# Patient Record
Sex: MALE | Race: BLACK OR AFRICAN AMERICAN | Employment: UNEMPLOYED | URBAN - METROPOLITAN AREA
[De-identification: names, ages, dates, MRNs, and addresses within clinical notes are randomized per-mention and may not be internally consistent; named-entity substitution may affect disease eponyms.]

---

## 2017-02-08 ENCOUNTER — HOSPITAL ENCOUNTER (EMERGENCY)
Age: 47
Discharge: HOME | End: 2017-02-08
Attending: EMERGENCY MEDICINE
Payer: MEDICARE

## 2017-02-08 ENCOUNTER — APPOINTMENT (OUTPATIENT)
Dept: GENERAL RADIOLOGY | Age: 47
End: 2017-02-08
Attending: EMERGENCY MEDICINE
Payer: MEDICARE

## 2017-02-08 VITALS
OXYGEN SATURATION: 100 % | WEIGHT: 210 LBS | RESPIRATION RATE: 20 BRPM | DIASTOLIC BLOOD PRESSURE: 98 MMHG | HEIGHT: 66 IN | SYSTOLIC BLOOD PRESSURE: 135 MMHG | HEART RATE: 85 BPM | TEMPERATURE: 97.8 F | BODY MASS INDEX: 33.75 KG/M2

## 2017-02-08 DIAGNOSIS — R06.02 SOB (SHORTNESS OF BREATH): Primary | ICD-10-CM

## 2017-02-08 LAB
ALBUMIN SERPL BCP-MCNC: 3.5 G/DL (ref 3.4–5)
ALBUMIN/GLOB SERPL: 0.9 {RATIO} (ref 0.8–1.7)
ALP SERPL-CCNC: 75 U/L (ref 45–117)
ALT SERPL-CCNC: 48 U/L (ref 16–61)
ANION GAP BLD CALC-SCNC: 5 MMOL/L (ref 3–18)
APTT PPP: 26.4 SEC (ref 23–36.4)
AST SERPL W P-5'-P-CCNC: 35 U/L (ref 15–37)
ATRIAL RATE: 89 BPM
BASOPHILS # BLD AUTO: 0.1 K/UL (ref 0–0.06)
BASOPHILS # BLD: 1 % (ref 0–2)
BILIRUB SERPL-MCNC: 0.7 MG/DL (ref 0.2–1)
BNP SERPL-MCNC: 2768 PG/ML (ref 0–450)
BUN SERPL-MCNC: 19 MG/DL (ref 7–18)
BUN/CREAT SERPL: 11 (ref 12–20)
CALCIUM SERPL-MCNC: 8.5 MG/DL (ref 8.5–10.1)
CALCULATED P AXIS, ECG09: 64 DEGREES
CALCULATED R AXIS, ECG10: 37 DEGREES
CALCULATED T AXIS, ECG11: -111 DEGREES
CHLORIDE SERPL-SCNC: 105 MMOL/L (ref 100–108)
CO2 SERPL-SCNC: 30 MMOL/L (ref 21–32)
CREAT SERPL-MCNC: 1.71 MG/DL (ref 0.6–1.3)
DIAGNOSIS, 93000: NORMAL
DIFFERENTIAL METHOD BLD: ABNORMAL
EOSINOPHIL # BLD: 0.3 K/UL (ref 0–0.4)
EOSINOPHIL NFR BLD: 5 % (ref 0–5)
ERYTHROCYTE [DISTWIDTH] IN BLOOD BY AUTOMATED COUNT: 13.6 % (ref 11.6–14.5)
GLOBULIN SER CALC-MCNC: 3.8 G/DL (ref 2–4)
GLUCOSE SERPL-MCNC: 99 MG/DL (ref 74–99)
HCT VFR BLD AUTO: 39.7 % (ref 36–48)
HGB BLD-MCNC: 13.2 G/DL (ref 13–16)
INR PPP: 1 (ref 0.8–1.2)
LYMPHOCYTES # BLD AUTO: 47 % (ref 21–52)
LYMPHOCYTES # BLD: 2.5 K/UL (ref 0.9–3.6)
MCH RBC QN AUTO: 28.6 PG (ref 24–34)
MCHC RBC AUTO-ENTMCNC: 33.2 G/DL (ref 31–37)
MCV RBC AUTO: 86.1 FL (ref 74–97)
MONOCYTES # BLD: 0.6 K/UL (ref 0.05–1.2)
MONOCYTES NFR BLD AUTO: 11 % (ref 3–10)
NEUTS SEG # BLD: 1.9 K/UL (ref 1.8–8)
NEUTS SEG NFR BLD AUTO: 36 % (ref 40–73)
P-R INTERVAL, ECG05: 158 MS
PLATELET # BLD AUTO: 260 K/UL (ref 135–420)
PMV BLD AUTO: 9.6 FL (ref 9.2–11.8)
POTASSIUM SERPL-SCNC: 3.9 MMOL/L (ref 3.5–5.5)
PROT SERPL-MCNC: 7.3 G/DL (ref 6.4–8.2)
PROTHROMBIN TIME: 12.7 SEC (ref 11.5–15.2)
Q-T INTERVAL, ECG07: 452 MS
QRS DURATION, ECG06: 182 MS
QTC CALCULATION (BEZET), ECG08: 549 MS
RBC # BLD AUTO: 4.61 M/UL (ref 4.7–5.5)
SODIUM SERPL-SCNC: 140 MMOL/L (ref 136–145)
TROPONIN I SERPL-MCNC: 0.04 NG/ML (ref 0–0.06)
VENTRICULAR RATE, ECG03: 89 BPM
WBC # BLD AUTO: 5.4 K/UL (ref 4.6–13.2)

## 2017-02-08 PROCEDURE — 74011250636 HC RX REV CODE- 250/636: Performed by: EMERGENCY MEDICINE

## 2017-02-08 PROCEDURE — 80053 COMPREHEN METABOLIC PANEL: CPT | Performed by: EMERGENCY MEDICINE

## 2017-02-08 PROCEDURE — 84484 ASSAY OF TROPONIN QUANT: CPT | Performed by: EMERGENCY MEDICINE

## 2017-02-08 PROCEDURE — 71020 XR CHEST PA LAT: CPT

## 2017-02-08 PROCEDURE — 83880 ASSAY OF NATRIURETIC PEPTIDE: CPT | Performed by: EMERGENCY MEDICINE

## 2017-02-08 PROCEDURE — 85025 COMPLETE CBC W/AUTO DIFF WBC: CPT | Performed by: EMERGENCY MEDICINE

## 2017-02-08 PROCEDURE — 99284 EMERGENCY DEPT VISIT MOD MDM: CPT

## 2017-02-08 PROCEDURE — 96374 THER/PROPH/DIAG INJ IV PUSH: CPT

## 2017-02-08 PROCEDURE — 93005 ELECTROCARDIOGRAM TRACING: CPT

## 2017-02-08 PROCEDURE — 85730 THROMBOPLASTIN TIME PARTIAL: CPT | Performed by: EMERGENCY MEDICINE

## 2017-02-08 PROCEDURE — 85610 PROTHROMBIN TIME: CPT | Performed by: EMERGENCY MEDICINE

## 2017-02-08 RX ORDER — FUROSEMIDE 10 MG/ML
40 INJECTION INTRAMUSCULAR; INTRAVENOUS
Status: COMPLETED | OUTPATIENT
Start: 2017-02-08 | End: 2017-02-08

## 2017-02-08 RX ADMIN — FUROSEMIDE 40 MG: 10 INJECTION, SOLUTION INTRAVENOUS at 15:04

## 2017-02-08 NOTE — ED NOTES
IV removed, VS obtained. Patient received and reviewed discharge instructions and follow up plan. Nad at discharge by ambulation with family at side. Patient ID band removed prior to discharge.

## 2017-02-08 NOTE — ED NOTES
Patient on phone when this nurse attempted to assess. Patient did not end phone conversation but is not to be in no distress.

## 2017-02-08 NOTE — DISCHARGE INSTRUCTIONS

## 2017-02-08 NOTE — ED PROVIDER NOTES
HPI Comments: 12:48 PM Perla Do is a 51 y.o. male with h/o HTN, CHF and PTSD who presents to ED complaining of constant SOB onset a couple days ago. Pt admits the SOB is worsened upon exertion. He reports some episodes of diaphoresis. Pt denies cough, CP, back, fever or chills. He denies smoking cigarettes. No other concerns nor complaints at this time. PCP: Barbara Bergman MD  Cardiologist: [facility]     The history is provided by the patient. Past Medical History:   Diagnosis Date    CHF (congestive heart failure) ([facility] [address] 12/2013     [facility]    Hypertension     [facility]    Sleep apnea        History reviewed. No pertinent past surgical history. History reviewed. No pertinent family history. Social History     Social History    Marital status:      Spouse name: N/A    Number of children: N/A    Years of education: N/A     Occupational History    retired      Social History Main Topics    Smoking status: Never Smoker    Smokeless tobacco: Never Used    Alcohol use Yes      Comment: Social drinker    Drug use: No    Sexual activity: Not on file     Other Topics Concern    Caffeine Concern Yes     4- 5 cups a day    Exercise No     not sedentary    [address] [facility] Yes     Social History Narrative         ALLERGIES: Review of patient's allergies indicates no known allergies. Review of Systems   Constitutional: Positive for diaphoresis. Negative for chills, fatigue, fever and unexpected weight change. HENT: Negative for congestion and rhinorrhea. Respiratory: Positive for shortness of breath (constant). Negative for chest tightness. Cardiovascular: Negative for chest pain, palpitations and leg swelling. Gastrointestinal: Negative for abdominal pain, nausea and vomiting. Genitourinary: Negative for dysuria. Musculoskeletal: Negative for back pain. Skin: Negative for rash.    Neurological: Negative for dizziness and weakness. Psychiatric/Behavioral: The patient is not nervous/anxious. All other systems reviewed and are negative. Vitals:    02/08/17 1327 02/08/17 1336 02/08/17 1430 02/08/17 1445   BP: (!) 138/99  (!) 137/99 (!) 136/93   Pulse:  88 89 88   Resp:  21 29 (!) 31   Temp:       SpO2: 97% 98% 99% 99%   Weight:       Height:                Physical Exam   Constitutional: He is oriented to person, place, and time. He appears well-developed and well-nourished. No distress. HENT:   Head: Normocephalic and atraumatic. Right Ear: External ear normal.   Left Ear: External ear normal.   Nose: Nose normal.   Mouth/Throat: Oropharynx is clear and moist.   Eyes: Conjunctivae and EOM are normal. Pupils are equal, round, and reactive to light. No scleral icterus. Neck: Normal range of motion. Neck supple. No JVD present. No tracheal deviation present. No thyromegaly present. Cardiovascular: Normal rate, regular rhythm, normal heart sounds and intact distal pulses. Exam reveals no gallop and no friction rub. No murmur heard. Pulmonary/Chest: Effort normal and breath sounds normal. He exhibits no tenderness. Abdominal: Soft. Bowel sounds are normal. He exhibits no distension. There is no tenderness. There is no rebound and no guarding. Musculoskeletal: Normal range of motion. He exhibits no edema or tenderness. Lymphadenopathy:     He has no cervical adenopathy. Neurological: He is alert and oriented to person, place, and time. No cranial nerve deficit. Coordination normal.   Skin: Skin is warm and dry. Psychiatric: He has a normal mood and affect. His behavior is normal. Judgment and thought content normal.   Nursing note and vitals reviewed.        MDM  Number of Diagnoses or Management Options     Amount and/or Complexity of Data Reviewed  Clinical lab tests: ordered and reviewed  Tests in the radiology section of CPT®: ordered and reviewed    Risk of Complications, Morbidity, and/or Mortality  Presenting problems: moderate  Diagnostic procedures: moderate  Management options: moderate      ED Course       Procedures  Vitals:  Patient Vitals for the past 12 hrs:   Temp Pulse Resp BP SpO2   02/08/17 1445 - 88 (!) 31 (!) 136/93 99 %   02/08/17 1430 - 89 29 (!) 137/99 99 %   02/08/17 1336 - 88 21 - 98 %   02/08/17 1327 - - - (!) 138/99 97 %   02/08/17 1226 97.8 °F (36.6 °C) 97 20 (!) 144/102 98 %         Medications ordered:   Medications   furosemide (LASIX) injection 40 mg (40 mg IntraVENous Given 2/8/17 1504)         Lab findings:  Recent Results (from the past 12 hour(s))   EKG, 12 LEAD, INITIAL    Collection Time: 02/08/17 12:36 PM   Result Value Ref Range    Ventricular Rate 89 BPM    Atrial Rate 89 BPM    P-R Interval 158 ms    QRS Duration 182 ms    Q-T Interval 452 ms    QTC Calculation (Bezet) 549 ms    Calculated P Axis 64 degrees    Calculated R Axis 37 degrees    Calculated T Axis -111 degrees    Diagnosis       Normal sinus rhythm  Possible Left atrial enlargement  Left bundle branch block  Abnormal ECG  When compared with ECG of 17-NOV-2016 17:27,  No significant change was found     CBC WITH AUTOMATED DIFF    Collection Time: 02/08/17  1:20 PM   Result Value Ref Range    WBC 5.4 4.6 - 13.2 K/uL    RBC 4.61 (L) 4.70 - 5.50 M/uL    HGB 13.2 13.0 - 16.0 g/dL    HCT 39.7 36.0 - 48.0 %    MCV 86.1 74.0 - 97.0 FL    MCH 28.6 24.0 - 34.0 PG    MCHC 33.2 31.0 - 37.0 g/dL    RDW 13.6 11.6 - 14.5 %    PLATELET 349 738 - 150 K/uL    MPV 9.6 9.2 - 11.8 FL    NEUTROPHILS 36 (L) 40 - 73 %    LYMPHOCYTES 47 21 - 52 %    MONOCYTES 11 (H) 3 - 10 %    EOSINOPHILS 5 0 - 5 %    BASOPHILS 1 0 - 2 %    ABS. NEUTROPHILS 1.9 1.8 - 8.0 K/UL    ABS. LYMPHOCYTES 2.5 0.9 - 3.6 K/UL    ABS. MONOCYTES 0.6 0.05 - 1.2 K/UL    ABS. EOSINOPHILS 0.3 0.0 - 0.4 K/UL    ABS.  BASOPHILS 0.1 (H) 0.0 - 0.06 K/UL    DF AUTOMATED     PROTHROMBIN TIME + INR    Collection Time: 02/08/17  1:20 PM   Result Value Ref Range Prothrombin time 12.7 11.5 - 15.2 sec    INR 1.0 0.8 - 1.2     PTT    Collection Time: 02/08/17  1:20 PM   Result Value Ref Range    aPTT 26.4 23.0 - 17.5 SEC   METABOLIC PANEL, COMPREHENSIVE    Collection Time: 02/08/17  1:20 PM   Result Value Ref Range    Sodium 140 136 - 145 mmol/L    Potassium 3.9 3.5 - 5.5 mmol/L    Chloride 105 100 - 108 mmol/L    CO2 30 21 - 32 mmol/L    Anion gap 5 3.0 - 18 mmol/L    Glucose 99 74 - 99 mg/dL    BUN 19 (H) 7.0 - 18 MG/DL    Creatinine 1.71 (H) 0.6 - 1.3 MG/DL    BUN/Creatinine ratio 11 (L) 12 - 20      GFR est AA 52 (L) >60 ml/min/1.73m2    GFR est non-AA 43 (L) >60 ml/min/1.73m2    Calcium 8.5 8.5 - 10.1 MG/DL    Bilirubin, total 0.7 0.2 - 1.0 MG/DL    ALT (SGPT) 48 16 - 61 U/L    AST (SGOT) 35 15 - 37 U/L    Alk. phosphatase 75 45 - 117 U/L    Protein, total 7.3 6.4 - 8.2 g/dL    Albumin 3.5 3.4 - 5.0 g/dL    Globulin 3.8 2.0 - 4.0 g/dL    A-G Ratio 0.9 0.8 - 1.7     PRO-BNP    Collection Time: 02/08/17  1:20 PM   Result Value Ref Range    NT pro-BNP 2768 (H) 0 - 450 PG/ML   TROPONIN I    Collection Time: 02/08/17  1:20 PM   Result Value Ref Range    Troponin-I, Qt. 0.04 0.00 - 0.06 NG/ML           X-Ray, CT or other radiology findings or impressions:  XR CHEST PA LAT   Final Result   1. Hypoinflation. 2. Mild to moderate prominence of the cardiac silhouette. 3. No infiltrate or consolidation. Read by radiology     Progress notes, Consult notes or additional Procedure notes:   3:50 PM Reassessed pt. Patient is feeling great. Pt is asymptomatic. Patient has some mild pulmonary edema which he states he has every couple of months. He states this is resolved by taking Lasix. Patient told to take his Lasix daily. Reviewed all results with pt and pt agrees with plan for discharge and appropriate  Follow up. All questions answered at this time. Pt voices understanding. Disposition:  Diagnosis:   1.  SOB (shortness of breath)        Disposition: discharged     Follow-up Information     Follow up With Details Comments Contact Info    Barbara Bergman MD Schedule an appointment as soon as possible for a visit in 2 days If symptoms worsen return to the ER [address]  [address] [place] [address]  [phone]      [facility] an appointment as soon as possible for a visit in 1 week If symptoms worsen return to the  [place] [address]  [phone]           Patient's Medications   Start Taking    No medications on file   Continue Taking    AMLODIPINE (NORVASC) 5 MG TABLET    Take 5 mg by mouth daily. ASPIRIN (ASPIRIN) 325 MG TABLET    Take 325 mg by mouth daily. CARVEDILOL (COREG) 25 MG TABLET    Take 25 mg by mouth two (2) times daily (with meals). FUROSEMIDE (LASIX) 20 MG TABLET    TAKE 1 TABLET BY MOUTH DAILY    HYDRALAZINE (APRESOLINE) 50 MG TABLET    Take 50 mg by mouth three (3) times daily. ISOSORBIDE MONONITRATE ER (IMDUR) 60 MG CR TABLET    Take  by mouth every morning. LISINOPRIL (PRINIVIL, ZESTRIL) 10 MG TABLET    Take 1 Tab by mouth daily. SERTRALINE (ZOLOFT) 100 MG TABLET       These Medications have changed    No medications on file   Stop Taking    No medications on file       Scribe Attestation  Brit Jaime scribing for and in the presence of Nika Hendricks MD (02/08/17/ 12:49 PM)    Physician Attestation  I personally performed the services described in this documentation, reviewed, and edited the documentation which was dictated to the scribe in my presence, and it accurately records my own words and actions.      Nika Hendricks MD (02/08/17/ 12:49 PM)    Signed by: Barry Mann, 02/08/17, 12:49 PM

## 2017-03-21 ENCOUNTER — HOSPITAL ENCOUNTER (EMERGENCY)
Age: 47
Discharge: HOME | End: 2017-03-21
Attending: EMERGENCY MEDICINE
Payer: MEDICARE

## 2017-03-21 ENCOUNTER — APPOINTMENT (OUTPATIENT)
Dept: GENERAL RADIOLOGY | Age: 47
End: 2017-03-21
Attending: EMERGENCY MEDICINE
Payer: MEDICARE

## 2017-03-21 VITALS
BODY MASS INDEX: 34.55 KG/M2 | SYSTOLIC BLOOD PRESSURE: 116 MMHG | OXYGEN SATURATION: 96 % | HEIGHT: 66 IN | HEART RATE: 84 BPM | DIASTOLIC BLOOD PRESSURE: 79 MMHG | WEIGHT: 215 LBS | RESPIRATION RATE: 27 BRPM

## 2017-03-21 DIAGNOSIS — I50.23 ACUTE ON CHRONIC SYSTOLIC CONGESTIVE HEART FAILURE (HCC): Primary | ICD-10-CM

## 2017-03-21 LAB
ANION GAP BLD CALC-SCNC: 9 MMOL/L (ref 3–18)
BASOPHILS # BLD AUTO: 0.1 K/UL (ref 0–0.06)
BASOPHILS # BLD: 1 % (ref 0–2)
BNP SERPL-MCNC: 5564 PG/ML (ref 0–450)
BUN SERPL-MCNC: 18 MG/DL (ref 7–18)
BUN/CREAT SERPL: 12 (ref 12–20)
CALCIUM SERPL-MCNC: 9.2 MG/DL (ref 8.5–10.1)
CHLORIDE SERPL-SCNC: 105 MMOL/L (ref 100–108)
CO2 SERPL-SCNC: 27 MMOL/L (ref 21–32)
CREAT SERPL-MCNC: 1.56 MG/DL (ref 0.6–1.3)
DIFFERENTIAL METHOD BLD: ABNORMAL
EOSINOPHIL # BLD: 0.2 K/UL (ref 0–0.4)
EOSINOPHIL NFR BLD: 4 % (ref 0–5)
ERYTHROCYTE [DISTWIDTH] IN BLOOD BY AUTOMATED COUNT: 14.4 % (ref 11.6–14.5)
GLUCOSE SERPL-MCNC: 116 MG/DL (ref 74–99)
HCT VFR BLD AUTO: 41.8 % (ref 36–48)
HGB BLD-MCNC: 13.9 G/DL (ref 13–16)
LYMPHOCYTES # BLD AUTO: 37 % (ref 21–52)
LYMPHOCYTES # BLD: 2.2 K/UL (ref 0.9–3.6)
MCH RBC QN AUTO: 28.1 PG (ref 24–34)
MCHC RBC AUTO-ENTMCNC: 33.3 G/DL (ref 31–37)
MCV RBC AUTO: 84.6 FL (ref 74–97)
MONOCYTES # BLD: 0.5 K/UL (ref 0.05–1.2)
MONOCYTES NFR BLD AUTO: 8 % (ref 3–10)
NEUTS SEG # BLD: 2.9 K/UL (ref 1.8–8)
NEUTS SEG NFR BLD AUTO: 50 % (ref 40–73)
PLATELET # BLD AUTO: 255 K/UL (ref 135–420)
PMV BLD AUTO: 10 FL (ref 9.2–11.8)
POTASSIUM SERPL-SCNC: 3.9 MMOL/L (ref 3.5–5.5)
RBC # BLD AUTO: 4.94 M/UL (ref 4.7–5.5)
SODIUM SERPL-SCNC: 141 MMOL/L (ref 136–145)
TROPONIN I SERPL-MCNC: 0.06 NG/ML (ref 0–0.06)
WBC # BLD AUTO: 5.8 K/UL (ref 4.6–13.2)

## 2017-03-21 PROCEDURE — 99285 EMERGENCY DEPT VISIT HI MDM: CPT

## 2017-03-21 PROCEDURE — 94762 N-INVAS EAR/PLS OXIMTRY CONT: CPT

## 2017-03-21 PROCEDURE — 84484 ASSAY OF TROPONIN QUANT: CPT | Performed by: EMERGENCY MEDICINE

## 2017-03-21 PROCEDURE — 71020 XR CHEST AP LAT: CPT

## 2017-03-21 PROCEDURE — 83880 ASSAY OF NATRIURETIC PEPTIDE: CPT | Performed by: EMERGENCY MEDICINE

## 2017-03-21 PROCEDURE — 85025 COMPLETE CBC W/AUTO DIFF WBC: CPT | Performed by: EMERGENCY MEDICINE

## 2017-03-21 PROCEDURE — 80048 BASIC METABOLIC PNL TOTAL CA: CPT | Performed by: EMERGENCY MEDICINE

## 2017-03-21 PROCEDURE — 93005 ELECTROCARDIOGRAM TRACING: CPT

## 2017-03-21 PROCEDURE — 74011250637 HC RX REV CODE- 250/637: Performed by: EMERGENCY MEDICINE

## 2017-03-21 PROCEDURE — 74011250636 HC RX REV CODE- 250/636: Performed by: EMERGENCY MEDICINE

## 2017-03-21 PROCEDURE — 96374 THER/PROPH/DIAG INJ IV PUSH: CPT

## 2017-03-21 RX ORDER — FUROSEMIDE 10 MG/ML
20 INJECTION INTRAMUSCULAR; INTRAVENOUS
Status: COMPLETED | OUTPATIENT
Start: 2017-03-21 | End: 2017-03-21

## 2017-03-21 RX ORDER — AMLODIPINE BESYLATE 5 MG/1
5 TABLET ORAL
Status: COMPLETED | OUTPATIENT
Start: 2017-03-21 | End: 2017-03-21

## 2017-03-21 RX ORDER — CARVEDILOL 6.25 MG/1
25 TABLET ORAL
Status: COMPLETED | OUTPATIENT
Start: 2017-03-21 | End: 2017-03-21

## 2017-03-21 RX ORDER — LISINOPRIL 5 MG/1
10 TABLET ORAL
Status: COMPLETED | OUTPATIENT
Start: 2017-03-21 | End: 2017-03-21

## 2017-03-21 RX ORDER — HYDRALAZINE HYDROCHLORIDE 25 MG/1
50 TABLET, FILM COATED ORAL
Status: COMPLETED | OUTPATIENT
Start: 2017-03-21 | End: 2017-03-21

## 2017-03-21 RX ADMIN — FUROSEMIDE 20 MG: 10 INJECTION, SOLUTION INTRAVENOUS at 09:04

## 2017-03-21 RX ADMIN — AMLODIPINE BESYLATE 5 MG: 5 TABLET ORAL at 08:22

## 2017-03-21 RX ADMIN — LISINOPRIL 10 MG: 5 TABLET ORAL at 08:22

## 2017-03-21 RX ADMIN — NITROGLYCERIN 1 INCH: 20 OINTMENT TOPICAL at 08:22

## 2017-03-21 RX ADMIN — HYDRALAZINE HYDROCHLORIDE 50 MG: 25 TABLET, FILM COATED ORAL at 08:22

## 2017-03-21 RX ADMIN — CARVEDILOL 25 MG: 6.25 TABLET, FILM COATED ORAL at 08:22

## 2017-03-21 NOTE — ED NOTES
Pt reports shortness of breath has improved. Pt remains with intermittent dry cough. Pt remains in NAD. Pt denies any needs from RN at this time.

## 2017-03-21 NOTE — ED PROVIDER NOTES
HPI Comments: 8:02 AM Jinny Cockayne is a 51 y.o. male with a history of HTN, PTSD, and CHF who presents to the ED complaining of SOB onset last night. Patient states when he lays down he feels like there is a \"block on his chest.\" He notes that he has felt like this before due to his CHF. Patient states \"I think I have the flu. \" He notes that he takes daily medication, but he mentions that he has not taken them today. Patient also c/o chills and productive cough. He denies any other symptoms. There are no other concerns at this time. The history is provided by the patient. Past Medical History:   Diagnosis Date    CHF (congestive heart failure) ([facility] [address] 12/2013    [facility]    Hypertension     [facility]     [facility]    Sleep apnea        History reviewed. No pertinent surgical history. History reviewed. No pertinent family history. Social History     Social History    Marital status:      Spouse name: N/A    Number of children: N/A    Years of education: N/A     Occupational History    retired      Social History Main Topics    Smoking status: Never Smoker    Smokeless tobacco: Never Used    Alcohol use Yes      Comment: Social drinker    Drug use: No    Sexual activity: Not on file     Other Topics Concern    Caffeine Concern Yes     4- 5 cups a day    Exercise No     not sedentary    [place] Yes     Social History Narrative         ALLERGIES: Review of patient's allergies indicates no known allergies. Review of Systems   Constitutional: Positive for chills. Negative for fatigue, fever and unexpected weight change. HENT: Negative for congestion and rhinorrhea. Respiratory: Positive for cough and shortness of breath. Negative for chest tightness. Cardiovascular: Negative for chest pain, palpitations and leg swelling. Gastrointestinal: Negative for abdominal pain, nausea and vomiting. Genitourinary: Negative for dysuria. Musculoskeletal: Negative for back pain. Skin: Negative for rash. Neurological: Negative for dizziness and weakness. Psychiatric/Behavioral: The patient is not nervous/anxious. Vitals:    03/21/17 0833 03/21/17 0844 03/21/17 0859 03/21/17 0900   BP:    (!) 140/92   Pulse: 98 99 97 93   Resp: (!) 34 26     SpO2: 96% 96% 97% 98%   Weight:       Height:                Physical Exam   Constitutional: He is oriented to person, place, and time. He appears well-developed and well-nourished. No distress. HENT:   Head: Normocephalic and atraumatic. Right Ear: External ear normal.   Left Ear: External ear normal.   Nose: Nose normal.   Mouth/Throat: Oropharynx is clear and moist.   Eyes: Conjunctivae and EOM are normal. Pupils are equal, round, and reactive to light. No scleral icterus. Neck: Normal range of motion. Neck supple. No JVD present. No tracheal deviation present. No thyromegaly present. Cardiovascular: Normal rate, regular rhythm, normal heart sounds and intact distal pulses. Exam reveals no gallop and no friction rub. No murmur heard. Pulmonary/Chest: Effort normal and breath sounds normal. He exhibits no tenderness. Abdominal: Soft. Bowel sounds are normal. He exhibits no distension. There is no tenderness. There is no rebound and no guarding. Musculoskeletal: Normal range of motion. He exhibits no edema or tenderness. Lymphadenopathy:     He has no cervical adenopathy. Neurological: He is alert and oriented to person, place, and time. No cranial nerve deficit. Coordination normal.   No sensory loss, Gait normal, Motor 5/5   Skin: Skin is warm and dry. Psychiatric: He has a normal mood and affect. His behavior is normal. Judgment and thought content normal.   Nursing note and vitals reviewed.        MDM  Number of Diagnoses or Management Options  Acute on chronic systolic congestive heart failure [facility]):   Diagnosis management comments: Pt has been diuresing after meds given, feels \"much better\". Wants to go home, agrees with dispo and F/U plan. Andrez Vences MD  10:26 AM         Amount and/or Complexity of Data Reviewed  Clinical lab tests: ordered and reviewed  Tests in the radiology section of CPT®: ordered and reviewed      ED Course       Procedures    Vitals:  Patient Vitals for the past 12 hrs:   Pulse Resp BP SpO2   03/21/17 0900 93 - (!) 140/92 98 %   03/21/17 0859 97 - - 97 %   03/21/17 0844 99 26 - 96 %   03/21/17 0833 98 (!) 34 - 96 %   03/21/17 0830 99 29 (!) 156/113 98 %   03/21/17 0816 97 18 - 99 %   03/21/17 0815 - - (!) 157/112 -   03/21/17 0800 - - (!) 156/113 95 %   03/21/17 0756 - - (!) 173/112 97 %   03/21/17 0754 - - - 95 %   03/21/17 0748 - - - 98 %   03/21/17 0744 (!) 101 20 (!) 159/127 99 %   03/21/17 0742 - - (!) 159/127 99 %   99% on RA, indicating adequate oxygenation.       Medications ordered:   Medications   nitroglycerin (NITROBID) 2 % ointment 1 Inch (1 Inch Topical Given 3/21/17 0822)   amLODIPine (NORVASC) tablet 5 mg (5 mg Oral Given 3/21/17 0822)   carvedilol (COREG) tablet 25 mg (25 mg Oral Given 3/21/17 0822)   hydrALAZINE (APRESOLINE) tablet 50 mg (50 mg Oral Given 3/21/17 0822)   lisinopril (PRINIVIL, ZESTRIL) tablet 10 mg (10 mg Oral Given 3/21/17 5518)   furosemide (LASIX) injection 20 mg (20 mg IntraVENous Given 3/21/17 0904)         Lab findings:  Recent Results (from the past 12 hour(s))   EKG, 12 LEAD, INITIAL    Collection Time: 03/21/17  7:45 AM   Result Value Ref Range    Ventricular Rate 101 BPM    Atrial Rate 101 BPM    P-R Interval 142 ms    QRS Duration 174 ms    Q-T Interval 420 ms    QTC Calculation (Bezet) 544 ms    Calculated P Axis 64 degrees    Calculated R Axis 38 degrees    Calculated T Axis -23 degrees    Diagnosis       Sinus tachycardia  Possible Left atrial enlargement  Left bundle branch block  Abnormal ECG  When compared with ECG of 08-FEB-2017 12:36,  No significant change was found     METABOLIC PANEL, BASIC Collection Time: 03/21/17  8:25 AM   Result Value Ref Range    Sodium 141 136 - 145 mmol/L    Potassium 3.9 3.5 - 5.5 mmol/L    Chloride 105 100 - 108 mmol/L    CO2 27 21 - 32 mmol/L    Anion gap 9 3.0 - 18 mmol/L    Glucose 116 (H) 74 - 99 mg/dL    BUN 18 7.0 - 18 MG/DL    Creatinine 1.56 (H) 0.6 - 1.3 MG/DL    BUN/Creatinine ratio 12 12 - 20      GFR est AA 58 (L) >60 ml/min/1.73m2    GFR est non-AA 48 (L) >60 ml/min/1.73m2    Calcium 9.2 8.5 - 10.1 MG/DL   PRO-BNP    Collection Time: 03/21/17  8:25 AM   Result Value Ref Range    NT pro-BNP 5564 (H) 0 - 450 PG/ML   CBC WITH AUTOMATED DIFF    Collection Time: 03/21/17  8:25 AM   Result Value Ref Range    WBC 5.8 4.6 - 13.2 K/uL    RBC 4.94 4.70 - 5.50 M/uL    HGB 13.9 13.0 - 16.0 g/dL    HCT 41.8 36.0 - 48.0 %    MCV 84.6 74.0 - 97.0 FL    MCH 28.1 24.0 - 34.0 PG    MCHC 33.3 31.0 - 37.0 g/dL    RDW 14.4 11.6 - 14.5 %    PLATELET 553 674 - 360 K/uL    MPV 10.0 9.2 - 11.8 FL    NEUTROPHILS 50 40 - 73 %    LYMPHOCYTES 37 21 - 52 %    MONOCYTES 8 3 - 10 %    EOSINOPHILS 4 0 - 5 %    BASOPHILS 1 0 - 2 %    ABS. NEUTROPHILS 2.9 1.8 - 8.0 K/UL    ABS. LYMPHOCYTES 2.2 0.9 - 3.6 K/UL    ABS. MONOCYTES 0.5 0.05 - 1.2 K/UL    ABS. EOSINOPHILS 0.2 0.0 - 0.4 K/UL    ABS. BASOPHILS 0.1 (H) 0.0 - 0.06 K/UL    DF AUTOMATED     TROPONIN I    Collection Time: 03/21/17  8:25 AM   Result Value Ref Range    Troponin-I, Qt. 0.06 0.00 - 0.06 NG/ML       EKG interpretation by ED Physician:    8:10 AM Sinus tachycardia at a rate of 101 with left bundle branch block. No acute changes. Interpreted by Dr. Gibson Arndt. X-Ray, CT or other radiology findings or impressions:  XR CHEST AP LAT        9:25 AM Cardiomegaly. No acute changes. Interpreted by Dr. Gibson Arndt. Progress notes, Consult notes or additional Procedure notes:         Disposition:  Diagnosis: No diagnosis found.     Disposition:     Follow-up Information     None           Patient's Medications   Start Taking    No medications on file   Continue Taking    AMLODIPINE (NORVASC) 5 MG TABLET    Take 5 mg by mouth daily. ASPIRIN (ASPIRIN) 325 MG TABLET    Take 325 mg by mouth daily. CARVEDILOL (COREG) 25 MG TABLET    Take 25 mg by mouth two (2) times daily (with meals). FUROSEMIDE (LASIX) 20 MG TABLET    TAKE 1 TABLET BY MOUTH DAILY    HYDRALAZINE (APRESOLINE) 50 MG TABLET    Take 50 mg by mouth three (3) times daily. ISOSORBIDE MONONITRATE ER (IMDUR) 60 MG CR TABLET    Take  by mouth every morning. LISINOPRIL (PRINIVIL, ZESTRIL) 10 MG TABLET    Take 1 Tab by mouth daily. SERTRALINE (ZOLOFT) 100 MG TABLET       These Medications have changed    No medications on file   Stop Taking    No medications on file     Scribe Attestation:    I, Asaf Billy, scribing for and in the presence of Bea Lesch, MD March 21, 2017 at 7:48 AM     Physician Attestation:   I personally performed the services described in this documentation, reviewed and edited the documentation which was dictated to the scribe in my presence, and it accurately records my words and actions.  Bea Lesch, MD  March 21, 2017 at 7:48 AM     Signed by: Barry Solo, March 21, 2017,  7:48 AM

## 2017-03-22 LAB
ATRIAL RATE: 101 BPM
CALCULATED P AXIS, ECG09: 64 DEGREES
CALCULATED R AXIS, ECG10: 38 DEGREES
CALCULATED T AXIS, ECG11: -23 DEGREES
DIAGNOSIS, 93000: NORMAL
P-R INTERVAL, ECG05: 142 MS
Q-T INTERVAL, ECG07: 420 MS
QRS DURATION, ECG06: 174 MS
QTC CALCULATION (BEZET), ECG08: 544 MS
VENTRICULAR RATE, ECG03: 101 BPM

## 2017-04-03 ENCOUNTER — HOSPITAL ENCOUNTER (EMERGENCY)
Age: 47
Discharge: LEFT BEFORE DISCHARGE | End: 2017-04-03
Attending: EMERGENCY MEDICINE
Payer: MEDICARE

## 2017-04-03 ENCOUNTER — APPOINTMENT (OUTPATIENT)
Dept: GENERAL RADIOLOGY | Age: 47
End: 2017-04-03
Attending: PHYSICIAN ASSISTANT
Payer: MEDICARE

## 2017-04-03 VITALS
OXYGEN SATURATION: 100 % | RESPIRATION RATE: 31 BRPM | TEMPERATURE: 97.6 F | HEART RATE: 90 BPM | BODY MASS INDEX: 33.75 KG/M2 | HEIGHT: 66 IN | SYSTOLIC BLOOD PRESSURE: 145 MMHG | DIASTOLIC BLOOD PRESSURE: 110 MMHG | WEIGHT: 210 LBS

## 2017-04-03 DIAGNOSIS — R03.0 ELEVATED BP WITHOUT DIAGNOSIS OF HYPERTENSION: ICD-10-CM

## 2017-04-03 DIAGNOSIS — I21.4 NSTEMI (NON-ST ELEVATED MYOCARDIAL INFARCTION) (HCC): Primary | ICD-10-CM

## 2017-04-03 LAB
ALBUMIN SERPL BCP-MCNC: 3.3 G/DL (ref 3.4–5)
ALBUMIN/GLOB SERPL: 0.8 {RATIO} (ref 0.8–1.7)
ALP SERPL-CCNC: 110 U/L (ref 45–117)
ALT SERPL-CCNC: 106 U/L (ref 16–61)
ANION GAP BLD CALC-SCNC: 9 MMOL/L (ref 3–18)
APTT PPP: 31.5 SEC (ref 23–36.4)
AST SERPL W P-5'-P-CCNC: 104 U/L (ref 15–37)
BASOPHILS # BLD AUTO: 0.1 K/UL (ref 0–0.06)
BASOPHILS # BLD: 1 % (ref 0–2)
BILIRUB SERPL-MCNC: 1.1 MG/DL (ref 0.2–1)
BNP SERPL-MCNC: 4594 PG/ML (ref 0–450)
BUN SERPL-MCNC: 23 MG/DL (ref 7–18)
BUN/CREAT SERPL: 15 (ref 12–20)
CALCIUM SERPL-MCNC: 8.8 MG/DL (ref 8.5–10.1)
CHLORIDE SERPL-SCNC: 105 MMOL/L (ref 100–108)
CK MB CFR SERPL CALC: 0 % (ref 0–4)
CK MB CFR SERPL CALC: 0 % (ref 0–4)
CK MB SERPL-MCNC: 1.5 NG/ML (ref 5–25)
CK MB SERPL-MCNC: 2 NG/ML (ref 5–25)
CK SERPL-CCNC: 5057 U/L (ref 39–308)
CK SERPL-CCNC: 5718 U/L (ref 39–308)
CO2 SERPL-SCNC: 27 MMOL/L (ref 21–32)
CREAT SERPL-MCNC: 1.5 MG/DL (ref 0.6–1.3)
DIFFERENTIAL METHOD BLD: ABNORMAL
EOSINOPHIL # BLD: 0.1 K/UL (ref 0–0.4)
EOSINOPHIL NFR BLD: 1 % (ref 0–5)
ERYTHROCYTE [DISTWIDTH] IN BLOOD BY AUTOMATED COUNT: 15.1 % (ref 11.6–14.5)
GLOBULIN SER CALC-MCNC: 3.9 G/DL (ref 2–4)
GLUCOSE SERPL-MCNC: 107 MG/DL (ref 74–99)
HCT VFR BLD AUTO: 40.5 % (ref 36–48)
HGB BLD-MCNC: 13.3 G/DL (ref 13–16)
INR PPP: 1.1 (ref 0.8–1.2)
LIPASE SERPL-CCNC: 110 U/L (ref 73–393)
LYMPHOCYTES # BLD AUTO: 36 % (ref 21–52)
LYMPHOCYTES # BLD: 2.7 K/UL (ref 0.9–3.6)
MAGNESIUM SERPL-MCNC: 2.3 MG/DL (ref 1.8–2.4)
MCH RBC QN AUTO: 27.7 PG (ref 24–34)
MCHC RBC AUTO-ENTMCNC: 32.8 G/DL (ref 31–37)
MCV RBC AUTO: 84.4 FL (ref 74–97)
MONOCYTES # BLD: 0.7 K/UL (ref 0.05–1.2)
MONOCYTES NFR BLD AUTO: 10 % (ref 3–10)
NEUTS SEG # BLD: 3.8 K/UL (ref 1.8–8)
NEUTS SEG NFR BLD AUTO: 52 % (ref 40–73)
PLATELET # BLD AUTO: 320 K/UL (ref 135–420)
PMV BLD AUTO: 10 FL (ref 9.2–11.8)
POTASSIUM SERPL-SCNC: 3.9 MMOL/L (ref 3.5–5.5)
PROT SERPL-MCNC: 7.2 G/DL (ref 6.4–8.2)
PROTHROMBIN TIME: 14.1 SEC (ref 11.5–15.2)
RBC # BLD AUTO: 4.8 M/UL (ref 4.7–5.5)
SODIUM SERPL-SCNC: 141 MMOL/L (ref 136–145)
TROPONIN I SERPL-MCNC: 0.14 NG/ML (ref 0–0.06)
TROPONIN I SERPL-MCNC: 0.14 NG/ML (ref 0–0.06)
WBC # BLD AUTO: 7.4 K/UL (ref 4.6–13.2)

## 2017-04-03 PROCEDURE — 71020 XR CHEST PA LAT: CPT

## 2017-04-03 PROCEDURE — 85610 PROTHROMBIN TIME: CPT | Performed by: EMERGENCY MEDICINE

## 2017-04-03 PROCEDURE — 83880 ASSAY OF NATRIURETIC PEPTIDE: CPT | Performed by: EMERGENCY MEDICINE

## 2017-04-03 PROCEDURE — 83690 ASSAY OF LIPASE: CPT

## 2017-04-03 PROCEDURE — 74011250637 HC RX REV CODE- 250/637: Performed by: EMERGENCY MEDICINE

## 2017-04-03 PROCEDURE — 85025 COMPLETE CBC W/AUTO DIFF WBC: CPT

## 2017-04-03 PROCEDURE — 99285 EMERGENCY DEPT VISIT HI MDM: CPT

## 2017-04-03 PROCEDURE — 83735 ASSAY OF MAGNESIUM: CPT

## 2017-04-03 PROCEDURE — 74011000250 HC RX REV CODE- 250: Performed by: EMERGENCY MEDICINE

## 2017-04-03 PROCEDURE — 80053 COMPREHEN METABOLIC PANEL: CPT

## 2017-04-03 PROCEDURE — 82550 ASSAY OF CK (CPK): CPT

## 2017-04-03 PROCEDURE — 93005 ELECTROCARDIOGRAM TRACING: CPT

## 2017-04-03 PROCEDURE — 74011250636 HC RX REV CODE- 250/636: Performed by: EMERGENCY MEDICINE

## 2017-04-03 PROCEDURE — 85730 THROMBOPLASTIN TIME PARTIAL: CPT | Performed by: EMERGENCY MEDICINE

## 2017-04-03 PROCEDURE — 96372 THER/PROPH/DIAG INJ SC/IM: CPT

## 2017-04-03 RX ORDER — LIDOCAINE HYDROCHLORIDE 20 MG/ML
15 SOLUTION OROPHARYNGEAL
Status: DISCONTINUED | OUTPATIENT
Start: 2017-04-03 | End: 2017-04-04 | Stop reason: HOSPADM

## 2017-04-03 RX ORDER — ENOXAPARIN SODIUM 100 MG/ML
1 INJECTION SUBCUTANEOUS
Status: COMPLETED | OUTPATIENT
Start: 2017-04-03 | End: 2017-04-03

## 2017-04-03 RX ORDER — GUAIFENESIN 100 MG/5ML
324 LIQUID (ML) ORAL
Status: COMPLETED | OUTPATIENT
Start: 2017-04-03 | End: 2017-04-03

## 2017-04-03 RX ORDER — NITROGLYCERIN 0.4 MG/1
0.4 TABLET SUBLINGUAL AS NEEDED
Status: DISCONTINUED | OUTPATIENT
Start: 2017-04-03 | End: 2017-04-04 | Stop reason: HOSPADM

## 2017-04-03 RX ADMIN — ASPIRIN 81 MG CHEWABLE TABLET 324 MG: 81 TABLET CHEWABLE at 18:54

## 2017-04-03 RX ADMIN — LIDOCAINE HYDROCHLORIDE 15 ML: 20 SOLUTION ORAL; TOPICAL at 17:42

## 2017-04-03 RX ADMIN — NITROGLYCERIN 0.4 MG: 0.4 TABLET SUBLINGUAL at 18:55

## 2017-04-03 RX ADMIN — PHENOBARBITAL 30 ML: 16.2; .1037; .0065; .0194 ELIXIR ORAL at 17:42

## 2017-04-03 RX ADMIN — ENOXAPARIN SODIUM 100 MG: 100 INJECTION SUBCUTANEOUS at 20:47

## 2017-04-03 RX ADMIN — NITROGLYCERIN 0.5 INCH: 20 OINTMENT TOPICAL at 19:45

## 2017-04-03 NOTE — ED PROVIDER NOTES
HPI Comments: 5:37 PM Jolene Du is a 51 y.o. male with hx of HTN (compliant with meds), sleep apnea, CHF, and PTSD who presents to the ED c/o SOB onset 3 days ago, worse when he lays down. He states SOB feels different from CHF. Pt also c/o sharp abd pain, not associated with eating. He took laxative but states it made him vomit. Pt also c/o abd bloating that \"feels tight,\" sharp L shoulder pain, and decreased appetite. Pt had last stress test \"a while ago\" done at the [place]. Pt was followed by Cardiology at [facility] ago. \" Pt denies hx of DM or MI, leg swelling, or any other sx at this time. The history is provided by the patient. No  was used. Past Medical History:   Diagnosis Date    CHF (congestive heart failure) ([facility] [address] 12/2013    [facility]    Hypertension     PTSD (post-traumatic stress disorder)     [facility]    Sleep apnea        History reviewed. No pertinent surgical history. History reviewed. No pertinent family history. Social History     Social History    Marital status:      Spouse name: N/A    Number of children: N/A    Years of education: N/A     Occupational History    retired      Social History Main Topics    Smoking status: Never Smoker    Smokeless tobacco: Never Used    Alcohol use Yes      Comment: Social drinker    Drug use: No    Sexual activity: Not on file     Other Topics Concern    Caffeine Concern Yes     4- 5 cups a day    Exercise No     not sedentary    [place] Yes     Social History Narrative         ALLERGIES: Review of patient's allergies indicates no known allergies. Review of Systems   Constitutional: Positive for appetite change (decreased). Negative for chills and fever. HENT: Negative for congestion and sneezing. Eyes: Negative for visual disturbance. Respiratory: Positive for shortness of breath. Negative for cough. Cardiovascular: Negative for chest pain and leg swelling.    Gastrointestinal: Positive for abdominal pain, nausea and vomiting. Genitourinary: Negative for difficulty urinating and dysuria. Musculoskeletal: Positive for arthralgias. Negative for back pain. Skin: Negative for rash. Neurological: Negative for weakness and headaches. All other systems reviewed and are negative. Vitals:    04/03/17 1704 04/03/17 1800 04/03/17 1830 04/03/17 1855   BP: 129/80 (!) 148/100  (!) 141/109   Pulse: 100 95 96 96   Resp: 18 16 16    Temp: 97.6 °F (36.4 °C)      SpO2: 97% 98% 100%    Weight: 95.3 kg (210 lb)      Height: 5' 6\" (1.676 m)               Physical Exam   Constitutional: He is oriented to person, place, and time. He appears well-developed and well-nourished. HENT:   Head: Normocephalic and atraumatic. Neck: Neck supple. No JVD present. Cardiovascular: Normal rate and regular rhythm. Pulmonary/Chest: Effort normal and breath sounds normal. No respiratory distress. He exhibits no tenderness. Abdominal: Soft. He exhibits no distension. There is no tenderness. There is no rebound and no guarding. Musculoskeletal: He exhibits no edema. Neurological: He is alert and oriented to person, place, and time. Skin: Skin is warm and dry. No erythema. Psychiatric: Judgment normal.   Nursing note and vitals reviewed. MDM  Number of Diagnoses or Management Options  Elevated BP without diagnosis of hypertension:   NSTEMI (non-ST elevated myocardial infarction) [facility]):   Diagnosis management comments: 52 y/o male presents with chest pain and sob. Hx of CAD, CHF, eval for chf, acs  Gi cocktail for sxs. Not pleuritic pain or consistent with dissection.         Amount and/or Complexity of Data Reviewed  Clinical lab tests: ordered and reviewed  Tests in the radiology section of CPT®: ordered and reviewed  Tests in the medicine section of CPT®: reviewed and ordered      ED Course       Procedures    Vitals:  Patient Vitals for the past 12 hrs:   Temp Pulse Resp BP SpO2 04/03/17 1855 - 96 - (!) 141/109 -   04/03/17 1830 - 96 16 - 100 %   04/03/17 1800 - 95 16 (!) 148/100 98 %   04/03/17 1704 97.6 °F (36.4 °C) 100 18 129/80 97 %     97% on RA, indicating adequate oxygenation. Medications ordered:   Medications   lidocaine (XYLOCAINE) 2 % viscous solution 15 mL (15 mL Mouth/Throat Given 4/3/17 1742)   nitroglycerin (NITROSTAT) tablet 0.4 mg (0.4 mg SubLINGual Given 4/3/17 1855)   maalox/donnatal (GI COCKTAIL COMPOUND) oral liquid (30 mL Oral Given 4/3/17 1742)   aspirin chewable tablet 324 mg (324 mg Oral Given 4/3/17 1854)         Lab findings:  Recent Results (from the past 12 hour(s))   EKG, 12 LEAD, INITIAL    Collection Time: 04/03/17  5:20 PM   Result Value Ref Range    Ventricular Rate 95 BPM    Atrial Rate 95 BPM    P-R Interval 148 ms    QRS Duration 172 ms    Q-T Interval 426 ms    QTC Calculation (Bezet) 535 ms    Calculated P Axis 67 degrees    Calculated R Axis 76 degrees    Calculated T Axis -59 degrees    Diagnosis       Normal sinus rhythm  Possible Left atrial enlargement  Left bundle branch block  Abnormal ECG  When compared with ECG of 21-MAR-2017 07:45,  Inverted T waves have replaced nonspecific T wave abnormality in Inferior   leads     PRO-BNP    Collection Time: 04/03/17  5:45 PM   Result Value Ref Range    NT pro-BNP 4594 (H) 0 - 450 PG/ML   CBC WITH AUTOMATED DIFF    Collection Time: 04/03/17  5:56 PM   Result Value Ref Range    WBC 7.4 4.6 - 13.2 K/uL    RBC 4.80 4.70 - 5.50 M/uL    HGB 13.3 13.0 - 16.0 g/dL    HCT 40.5 36.0 - 48.0 %    MCV 84.4 74.0 - 97.0 FL    MCH 27.7 24.0 - 34.0 PG    MCHC 32.8 31.0 - 37.0 g/dL    RDW 15.1 (H) 11.6 - 14.5 %    PLATELET 687 374 - 338 K/uL    MPV 10.0 9.2 - 11.8 FL    NEUTROPHILS 52 40 - 73 %    LYMPHOCYTES 36 21 - 52 %    MONOCYTES 10 3 - 10 %    EOSINOPHILS 1 0 - 5 %    BASOPHILS 1 0 - 2 %    ABS. NEUTROPHILS 3.8 1.8 - 8.0 K/UL    ABS. LYMPHOCYTES 2.7 0.9 - 3.6 K/UL    ABS. MONOCYTES 0.7 0.05 - 1.2 K/UL    ABS. EOSINOPHILS 0.1 0.0 - 0.4 K/UL    ABS. BASOPHILS 0.1 (H) 0.0 - 0.06 K/UL    DF AUTOMATED     METABOLIC PANEL, COMPREHENSIVE    Collection Time: 04/03/17  5:56 PM   Result Value Ref Range    Sodium 141 136 - 145 mmol/L    Potassium 3.9 3.5 - 5.5 mmol/L    Chloride 105 100 - 108 mmol/L    CO2 27 21 - 32 mmol/L    Anion gap 9 3.0 - 18 mmol/L    Glucose 107 (H) 74 - 99 mg/dL    BUN 23 (H) 7.0 - 18 MG/DL    Creatinine 1.50 (H) 0.6 - 1.3 MG/DL    BUN/Creatinine ratio 15 12 - 20      GFR est AA >60 >60 ml/min/1.73m2    GFR est non-AA 50 (L) >60 ml/min/1.73m2    Calcium 8.8 8.5 - 10.1 MG/DL    Bilirubin, total 1.1 (H) 0.2 - 1.0 MG/DL    ALT (SGPT) 106 (H) 16 - 61 U/L    AST (SGOT) 104 (H) 15 - 37 U/L    Alk. phosphatase 110 45 - 117 U/L    Protein, total 7.2 6.4 - 8.2 g/dL    Albumin 3.3 (L) 3.4 - 5.0 g/dL    Globulin 3.9 2.0 - 4.0 g/dL    A-G Ratio 0.8 0.8 - 1.7     LIPASE    Collection Time: 04/03/17  5:56 PM   Result Value Ref Range    Lipase 110 73 - 393 U/L   MAGNESIUM    Collection Time: 04/03/17  5:56 PM   Result Value Ref Range    Magnesium 2.3 1.8 - 2.4 mg/dL   CARDIAC PANEL,(CK, CKMB & TROPONIN)    Collection Time: 04/03/17  5:56 PM   Result Value Ref Range    CK 5718 (H) 39 - 308 U/L    CK - MB 2.0 <3.6 ng/ml    CK-MB Index 0.0 0.0 - 4.0 %    Troponin-I, Qt. 0.14 (H) 0.00 - 0.06 NG/ML       EKG interpretation by ED Physician:  6082, rate 95, normal axis, LBBB, no Scarbossa criteria    X-Ray, CT or other radiology findings or impressions:  XR CHEST PA LAT    (Results Pending)  No acute process. Cardiomegaly. Per Dr. Ny Jacobson. Progress notes, Consult notes or additional Procedure notes:   Pt with elevated trop, was normal 2 weeks ago. Currently pain free, will admit. 6:53 PM Pt's Cardiology is through [facility] (?Dr. Shawn Marrufo). Requests admission there. 715 PM. Care transferred to Dr. Reymundo Carty, awaiting admission at [facility]. Disposition:  Diagnosis:   1.  [facility]non[facility] elevated [facility]    2. Elevated BP without diagnosis of hypertension        Scribe Attestation:   Yoselin Ordonez acting as a scribe for and in the presence of Armstead Merlin, DO April 03, 2017 at 5:21 PM     Signed by: Barry Raya, April 03, 2017, 5:21 PM    Provider Attestation:   I personally performed the services described in the documentation, reviewed the documentation, as recorded by the scribe in my presence, and it accurately and completely records my words and actions.      Reviewed and signed by:  Armstead Merlin, DO

## 2017-04-03 NOTE — ED NOTES
I performed a brief evaluation, including history and physical, of the patient here in triage and I have determined that pt will need further treatment and evaluation from the main side ER physician. I have placed initial orders to help in expediting patients care. Left sided back/chest/shoulder/abdominal pain, worse with walking up stairs.      April 03, 2017 at 5:04 PM - Virgilio Weber PA-C

## 2017-04-03 NOTE — ED TRIAGE NOTES
C/O epigastric pain that radiates to back x 3 days. Pt states that symptoms are worse when laying flat.

## 2017-04-03 NOTE — ED NOTES
Vitals:  No data found. Medications ordered:   Medications   maalox/donnatal (GI COCKTAIL COMPOUND) oral liquid (30 mL Oral Given 4/3/17 1742)   aspirin chewable tablet 324 mg (324 mg Oral Given 4/3/17 1854)   nitroglycerin (NITROBID) 2 % ointment 0.5 Inch (0.5 Inches Topical Given 4/3/17 1945)   enoxaparin (LOVENOX) injection 100 mg (100 mg SubCUTAneous Given 4/3/17 2047)         Lab findings:  No results found for this or any previous visit (from the past 12 hour(s)). X-Ray, CT or other radiology findings or impressions:  XR CHEST PA LAT   Final Result   IMPRESSION:    Mild stable cardiomegaly. Progress notes, Consult notes or additional Procedure notes:   7:00 PM Pt turned over from Dr. Scott Izaguirre to Dr. Jovany Parsons. 7:51 PM Consult:  Discussed care with Dr. Sree Azevedo, Hospitalist at [facility]. Standard discussion; including history of patients chief complaint, available diagnostic results, and treatment course. Will admit. Was informed it will take 2 hours for available bed.    7:58 PM PM Discussed with pt tx plan but he refuses transfer to [facility] at this time. Agrees with further ED treatment and work up of Lovenox and repeat cardiac markers. Pt verbalized understanding for risk of heart attack, including death, but he will not change his mind. Currently pain free. Heme negative. 10:52 PM Signed AMA. A and o x 3, refuses to stay or go to [facility] as arranged. + capacity to make decisions. Aware of high probability of stroke with paralysis or death. Disposition:  Diagnosis:   1. NSTEMI (non-ST elevated myocardial infarction) ([facility] [address]    2. Elevated BP without diagnosis of hypertension        Disposition: AMA    Follow-up Information     Follow up With Details Comments [facility] ED Go today  Callie Richard.   Julien Mukherjee  [phone]           Discharge Medication List as of 4/3/2017 10:53 PM      CONTINUE these medications which have NOT CHANGED    Details   lisinopril (PRINIVIL, ZESTRIL) 10 mg tablet Take 1 Tab by mouth daily. , Normal, Disp-30 Tab, R-6      furosemide (LASIX) 20 mg tablet TAKE 1 TABLET BY MOUTH DAILY, Normal**Patient requests 90 days supply**Disp-90 Tab, R-1      sertraline (ZOLOFT) 100 mg tablet Historical Med      aspirin (ASPIRIN) 325 mg tablet Take 325 mg by mouth daily. , Historical Med      amlodipine (NORVASC) 5 mg tablet Take 5 mg by mouth daily. , Historical Med      isosorbide mononitrate ER (IMDUR) 60 mg CR tablet Take  by mouth every morning., Historical Med      carvedilol (COREG) 25 mg tablet Take 25 mg by mouth two (2) times daily (with meals). , Historical Med      hydralazine (APRESOLINE) 50 mg tablet Take 50 mg by mouth three (3) times daily. , Historical Med                Scribe Attestation:   Marie Carter acting as a scribe for and in the presence of Stacy Jeff MD April 03, 2017 at 7:55 PM     Signed by: Barry Coronel, April 03, 2017, 7:55 PM    Provider Attestation:   I personally performed the services described in the documentation, reviewed the documentation, as recorded by the scribe in my presence, and it accurately and completely records my words and actions.      Reviewed and signed by:  Stacy Jeff MD

## 2017-04-04 LAB
ATRIAL RATE: 95 BPM
CALCULATED P AXIS, ECG09: 67 DEGREES
CALCULATED R AXIS, ECG10: 76 DEGREES
CALCULATED T AXIS, ECG11: -59 DEGREES
DIAGNOSIS, 93000: NORMAL
P-R INTERVAL, ECG05: 148 MS
Q-T INTERVAL, ECG07: 426 MS
QRS DURATION, ECG06: 172 MS
QTC CALCULATION (BEZET), ECG08: 535 MS
VENTRICULAR RATE, ECG03: 95 BPM

## 2017-04-04 NOTE — ED NOTES
PT was educated about leaving AMA, and coming back to the ED if he had further chest pain. IV was removed with catheter intact, wrist band removed, and shredded.

## 2017-04-05 ENCOUNTER — PATIENT OUTREACH (OUTPATIENT)
Dept: FAMILY MEDICINE CLINIC | Age: 47
End: 2017-04-05

## 2017-04-05 NOTE — PROGRESS NOTES
Patient presented to [facility] ED on 4/3/17 with complaints of SOB xx 3 days. Patient has history of sleep apnea, CHF, and PTSD. Left message for patient to return phone call to office. Of note:  Patient visited ED on 2/8/17 and 3/21/17 for same complaint. Diagnosed Acute on chronic systolic congestive heart failure on 3/21/17. Last seen in office on 11/17/17. Per provider notes patient \"with c/o SOB for the last 2-3 days. Pt has a known cardiomyopathy with a low EF ( 15-40% per chart review) with need for a AICD which he has refused. \"    No recent follow up by cardiology via chart review.

## 2017-04-06 ENCOUNTER — OFFICE VISIT (OUTPATIENT)
Dept: FAMILY MEDICINE CLINIC | Age: 47
End: 2017-04-06

## 2017-04-06 VITALS
WEIGHT: 224 LBS | RESPIRATION RATE: 16 BRPM | HEIGHT: 66 IN | SYSTOLIC BLOOD PRESSURE: 120 MMHG | TEMPERATURE: 98.2 F | HEART RATE: 92 BPM | BODY MASS INDEX: 36 KG/M2 | DIASTOLIC BLOOD PRESSURE: 90 MMHG | OXYGEN SATURATION: 98 %

## 2017-04-06 DIAGNOSIS — R10.30 LOWER ABDOMINAL PAIN: Primary | ICD-10-CM

## 2017-04-06 RX ORDER — PANTOPRAZOLE SODIUM 40 MG/1
40 TABLET, DELAYED RELEASE ORAL DAILY
Qty: 30 TAB | Refills: 4 | Status: SHIPPED | OUTPATIENT
Start: 2017-04-06 | End: 2017-06-06 | Stop reason: ALTCHOICE

## 2017-04-06 NOTE — PROGRESS NOTES
HISTORY OF PRESENT ILLNESS  Lester Valenzuela is a 51 y.o. male. HPI  Patient is here today for evaluation and treatment of: Abdominal Pain    Abdominal Pain: Pt states that after he eats he gets a dull pain in his abdomen; Pt went to [facility] ED about 2 days ago because of the pain. He had what sounds like a GI cocktail but does not think this helped. Pt has vomited. He tried some MOM for possible constipation. This got better. No fever; No blood in stool. Last BM was a small volume; Was this am.     He is on meds as listed below. Current Outpatient Prescriptions:     lisinopril (PRINIVIL, ZESTRIL) 10 mg tablet, Take 1 Tab by mouth daily. , Disp: 30 Tab, Rfl: 6    furosemide (LASIX) 20 mg tablet, TAKE 1 TABLET BY MOUTH DAILY (Patient taking differently: TAKE 1 TABLET BY MOUTH IN THE MORNING AND TAKE 1 TABLET BY MOUTH IN THE EVENING.), Disp: 90 Tab, Rfl: 1    sertraline (ZOLOFT) 100 mg tablet, , Disp: , Rfl:     aspirin (ASPIRIN) 325 mg tablet, Take 325 mg by mouth daily. , Disp: , Rfl:     amlodipine (NORVASC) 5 mg tablet, Take 5 mg by mouth daily. , Disp: , Rfl:     isosorbide mononitrate ER (IMDUR) 60 mg CR tablet, Take  by mouth every morning., Disp: , Rfl:     carvedilol (COREG) 25 mg tablet, Take 25 mg by mouth two (2) times daily (with meals). , Disp: , Rfl:     hydralazine (APRESOLINE) 50 mg tablet, Take 50 mg by mouth three (3) times daily. , Disp: , Rfl:       PMH,  Meds, Allergies, Family History, Social history reviewed    Review of Systems   Constitutional: Negative for chills and fever. Gastrointestinal: Positive for abdominal pain. Negative for diarrhea, nausea and vomiting. Physical Exam   Constitutional: He appears well-developed and well-nourished. No distress. Cardiovascular: Normal rate and regular rhythm. Exam reveals no gallop and no friction rub. No murmur heard. Pulmonary/Chest: Breath sounds normal. No respiratory distress. He has no wheezes. He has no rales.  He exhibits no tenderness. Abdominal: Bowel sounds are normal. He exhibits no distension. There is no tenderness. There is no rebound and no guarding. Nursing note and vitals reviewed. Visit Vitals    /90    Pulse 92    Temp 98.2 °F (36.8 °C) (Oral)    Resp 16    Ht 5' 6\" (1.676 m)    Wt 224 lb (101.6 kg)    SpO2 98%    BMI 36.15 kg/m2         ASSESSMENT and PLAN    ICD-10-CM ICD-9-CM    1. Lower abdominal pain R10.30 789.09        As above, new   treatment plan as listed below  Orders Placed This Encounter    pantoprazole (PROTONIX) 40 mg tablet     Consider small meals frequently  Follow-up Disposition:  Return in about 8 weeks (around 6/1/2017) for abdominal pain. An After Visit Summary was printed and given to the patient. This has been fully explained to the patient, who indicates understanding.

## 2017-04-06 NOTE — MR AVS SNAPSHOT
Visit Information Date & Time Provider Department Dept. Phone Encounter #  
 4/6/2017 12:20 PM Hugo Anglin, [address] [id number] Follow-up Instructions Return in about 8 weeks (around 6/1/2017) for abdominal pain. Follow-up and Disposition History Upcoming Health Maintenance Date Due Pneumococcal 19-64 Medium Risk (1 of 1 - PPSV23) 9/25/1988 DTaP/Tdap/Td series (1 - Tdap) 9/25/1990 INFLUENZA AGE 9 TO ADULT 8/1/2016 Allergies as of 4/6/2017  Review Complete On: 4/6/2017 By: Miki Cheadle, LPN No Known Allergies Current Immunizations  Never Reviewed No immunizations on file. Not reviewed this visit You Were Diagnosed With   
  
 Codes Comments Lower abdominal pain    -  Primary ICD-10-CM: R10.30 ICD-9-CM: 789.09 Vitals BP Pulse Temp Resp Height(growth percentile) Weight(growth percentile) 120/90 92 98.2 °F (36.8 °C) (Oral) 16 5' 6\" (1.676 m) 224 lb (101.6 kg) SpO2 BMI Smoking Status 98% 36.15 kg/m2 Never Smoker Vitals History BMI and BSA Data Body Mass Index Body Surface Area  
 36.15 kg/m 2 2.18 m 2 Preferred Pharmacy Pharmacy Name Phone [address], Sowmya Armando [address] [place] [address] [phone] [facility] [phone] Your Updated Medication List  
  
   
This list is accurate as of: 4/6/17 12:59 PM.  Always use your most recent med list. amLODIPine 5 mg tablet Commonly known as:  Sparkle Gifford Take 5 mg by mouth daily. aspirin 325 mg tablet Commonly known as:  ASPIRIN Take 325 mg by mouth daily. carvedilol 25 mg tablet Commonly known as:  Darletta Majoaquim Take 25 mg by mouth two (2) times daily (with meals). furosemide 20 mg tablet Commonly known as:  LASIX TAKE 1 TABLET BY MOUTH DAILY  
  
 hydrALAZINE 50 mg tablet Commonly known as:  APRESOLINE  
 Take 50 mg by mouth three (3) times daily. isosorbide mononitrate ER 60 mg CR tablet Commonly known as:  IMDUR Take  by mouth every morning. lisinopril 10 mg tablet Commonly known as:  Sydna Lies Take 1 Tab by mouth daily. pantoprazole 40 mg tablet Commonly known as:  PROTONIX Take 1 Tab by mouth daily. sertraline 100 mg tablet Commonly known as:  ZOLOFT Prescriptions Sent to Pharmacy Refills  
 pantoprazole (PROTONIX) 40 mg tablet 4 Sig: Take 1 Tab by mouth daily. Class: Normal  
 Pharmacy: [address] [place] [address] #: [phone] Route: Oral  
  
Follow-up Instructions Return in about 8 weeks (around 6/1/2017) for abdominal pain. Patient Instructions Indigestion (Dyspepsia or Heartburn): Care Instructions Your Care Instructions Sometimes it can be hard to pinpoint the cause of indigestion (dyspepsia or heartburn). Most cases of an upset stomach with bloating, burning, burping, and nausea are minor and go away within several hours. Home treatment and over-the-counter medicine often are able to control symptoms. But if you take medicine to relieve your indigestion without making diet and lifestyle changes, your symptoms are likely to return again and again. If you get indigestion often, it may be a sign of a more serious medical problem. Be sure to follow up with your doctor, who may want to do tests to be sure of the cause of your indigestion. Follow-up care is a key part of your treatment and safety. Be sure to make and go to all appointments, and call your doctor if you are having problems. It's also a good idea to know your test results and keep a list of the medicines you take. How can you care for yourself at home? · Your doctor may recommend over-the-counter medicine.  For mild or occasional indigestion, antacids such as Tums, Gaviscon, Mylanta, or Maalox may help. Be careful when you take over-the-counter antacid medicines. Many of these medicines have aspirin in them. Read the label to make sure that you are not taking more than the recommended dose. Too much aspirin can be harmful. · Your doctor also may recommend over-the-counter acid reducers, such as Pepcid AC, Tagamet HB, Zantac 75, or Prilosec. Read and follow all instructions on the label. If you use these medicines often, talk with your doctor. · Change your eating habits. ¨ It's best to eat several small meals instead of two or three large meals. ¨ After you eat, wait 2 to 3 hours before you lie down. ¨ Chocolate, mint, and alcohol can make GERD worse. ¨ Spicy foods, foods that have a lot of acid (like tomatoes and oranges), and coffee can make GERD symptoms worse in some people. If your symptoms are worse after you eat a certain food, you may want to stop eating that food to see if your symptoms get better. · Do not smoke or chew tobacco. Smoking can make GERD worse. If you need help quitting, talk to your doctor about stop-smoking programs and medicines. These can increase your chances of quitting for good. · If you have GERD symptoms at night, raise the head of your bed 6 to 8 inches by putting the frame on blocks or placing a foam wedge under the head of your mattress. (Adding extra pillows does not work.) · Do not wear tight clothing around your middle. · Lose weight if you need to. Losing just 5 to 10 pounds can help. · Do not take anti-inflammatory medicines, such as aspirin, ibuprofen (Advil, Motrin), or naproxen (Aleve). These can irritate the stomach. If you need a pain medicine, try acetaminophen (Tylenol), which does not cause stomach upset. When should you call for help? Call 911 anytime you think you may need emergency care. For example, call if: 
· You passed out (lost consciousness). · You vomit blood or what looks like coffee grounds. · You pass maroon or very bloody stools. · You have chest pain or pressure. This may occur with: ¨ Sweating. ¨ Shortness of breath. ¨ Nausea or vomiting. ¨ Pain that spreads from the chest to the neck, jaw, or one or both shoulders or arms. ¨ Feeling dizzy or lightheaded. ¨ A fast or uneven pulse. After calling 911, chew 1 adult-strength aspirin. Wait for an ambulance. Do not try to drive yourself. Call your doctor now or seek immediate medical care if: 
· You have severe belly pain. · Your stools are black and tarlike or have streaks of blood. · You have trouble swallowing. · You are losing weight and do not know why. Watch closely for changes in your health, and be sure to contact your doctor if: 
· You do not get better as expected. Where can you learn more? Go to http://emiliano-madeleine.info/. Enter H088 in the search box to learn more about \"Indigestion (Dyspepsia or Heartburn): Care Instructions. \" Current as of: November 16, 2016 Content Version: 11.2 © 6197-9531 [employer]. Care instructions adapted under license by [employer] (which disclaims liability or warranty for this information). If you have questions about a medical condition or this instruction, always ask your healthcare professional. [place] [address] any warranty or liability for your use of this information. Introducing [facility]! Alvaro Kimble introduces DFine patient portal. Now you can access parts of your medical record, email your doctor's office, and request medication refills online. 1. In your internet browser, go to [email or web address] 2. Click on the First Time User? Click Here link in the Sign In box. You will see the New Member Sign Up page. 3. Enter your DFine Access Code exactly as it appears below.  You will not need to use this code after youve completed the sign-up process. If you do not sign up before the expiration date, you must request a new code. · [employer] Access Code: [email or web address] Expires: 6/19/2017  7:31 AM 
 
4. Enter the last four digits of your Social Security Number (xxxx) and Date of Birth (mm/dd/yyyy) as indicated and click Submit. You will be taken to the next sign-up page. 5. Create a [employer] ID. This will be your [employer] login ID and cannot be changed, so think of one that is secure and easy to remember. 6. Create a [employer] password. You can change your password at any time. 7. Enter your Password Reset Question and Answer. This can be used at a later time if you forget your password. 8. Enter your e-mail address. You will receive e-mail notification when new information is available in [address]. 9. Click Sign Up. You can now view and download portions of your medical record. 10. Click the Download Summary menu link to download a portable copy of your medical information. If you have questions, please visit the Frequently Asked Questions section of the [employer] website. Remember, [employer] is NOT to be used for urgent needs. For medical emergencies, dial 911. Now available from your iPhone and Android! Please provide this summary of care documentation to your next provider. Your primary care clinician is listed as [address]. If you have any questions after today's visit, please call [phone].

## 2017-04-06 NOTE — PROGRESS NOTES
1. Have you been to the ER, urgent care clinic since your last visit? Hospitalized since your last visit? Yes Where: [facility] Emergency Room    2. Have you seen or consulted any other health care providers outside of the [address] [place] [address] since your last visit? Include any pap smears or colon screening.  Yes Where: Ander Martinez MD - cardiology

## 2017-04-06 NOTE — PATIENT INSTRUCTIONS
Indigestion (Dyspepsia or Heartburn): Care Instructions  Your Care Instructions  Sometimes it can be hard to pinpoint the cause of indigestion (dyspepsia or heartburn). Most cases of an upset stomach with bloating, burning, burping, and nausea are minor and go away within several hours. Home treatment and over-the-counter medicine often are able to control symptoms. But if you take medicine to relieve your indigestion without making diet and lifestyle changes, your symptoms are likely to return again and again. If you get indigestion often, it may be a sign of a more serious medical problem. Be sure to follow up with your doctor, who may want to do tests to be sure of the cause of your indigestion. Follow-up care is a key part of your treatment and safety. Be sure to make and go to all appointments, and call your doctor if you are having problems. It's also a good idea to know your test results and keep a list of the medicines you take. How can you care for yourself at home? · Your doctor may recommend over-the-counter medicine. For mild or occasional indigestion, antacids such as Tums, Gaviscon, Mylanta, or Maalox may help. Be careful when you take over-the-counter antacid medicines. Many of these medicines have aspirin in them. Read the label to make sure that you are not taking more than the recommended dose. Too much aspirin can be harmful. · Your doctor also may recommend over-the-counter acid reducers, such as Pepcid AC, Tagamet HB, Zantac 75, or Prilosec. Read and follow all instructions on the label. If you use these medicines often, talk with your doctor. · Change your eating habits. ¨ It's best to eat several small meals instead of two or three large meals. ¨ After you eat, wait 2 to 3 hours before you lie down. ¨ Chocolate, mint, and alcohol can make GERD worse. ¨ Spicy foods, foods that have a lot of acid (like tomatoes and oranges), and coffee can make GERD symptoms worse in some people. If your symptoms are worse after you eat a certain food, you may want to stop eating that food to see if your symptoms get better. · Do not smoke or chew tobacco. Smoking can make GERD worse. If you need help quitting, talk to your doctor about stop-smoking programs and medicines. These can increase your chances of quitting for good. · If you have GERD symptoms at night, raise the head of your bed 6 to 8 inches by putting the frame on blocks or placing a foam wedge under the head of your mattress. (Adding extra pillows does not work.)  · Do not wear tight clothing around your middle. · Lose weight if you need to. Losing just 5 to 10 pounds can help. · Do not take anti-inflammatory medicines, such as aspirin, ibuprofen (Advil, Motrin), or naproxen (Aleve). These can irritate the stomach. If you need a pain medicine, try acetaminophen (Tylenol), which does not cause stomach upset. When should you call for help? Call 911 anytime you think you may need emergency care. For example, call if:  · You passed out (lost consciousness). · You vomit blood or what looks like coffee grounds. · You pass maroon or very bloody stools. · You have chest pain or pressure. This may occur with:  ¨ Sweating. ¨ Shortness of breath. ¨ Nausea or vomiting. ¨ Pain that spreads from the chest to the neck, jaw, or one or both shoulders or arms. ¨ Feeling dizzy or lightheaded. ¨ A fast or uneven pulse. After calling 911, chew 1 adult-strength aspirin. Wait for an ambulance. Do not try to drive yourself. Call your doctor now or seek immediate medical care if:  · You have severe belly pain. · Your stools are black and tarlike or have streaks of blood. · You have trouble swallowing. · You are losing weight and do not know why. Watch closely for changes in your health, and be sure to contact your doctor if:  · You do not get better as expected. Where can you learn more? Go to http://jarrod.info/.   Enter D822 in the search box to learn more about \"Indigestion (Dyspepsia or Heartburn): Care Instructions. \"  Current as of: November 16, 2016  Content Version: 11.2  © 8356-0362 [employer], [employer]. Care instructions adapted under license by [employer] (which disclaims liability or warranty for this information). If you have questions about a medical condition or this instruction, always ask your healthcare professional. [place] [address] any warranty or liability for your use of this information.

## 2017-04-25 ENCOUNTER — PATIENT OUTREACH (OUTPATIENT)
Dept: FAMILY MEDICINE CLINIC | Age: 47
End: 2017-04-25

## 2017-05-04 ENCOUNTER — OFFICE VISIT (OUTPATIENT)
Dept: FAMILY MEDICINE CLINIC | Age: 47
End: 2017-05-04

## 2017-05-04 VITALS
WEIGHT: 225 LBS | OXYGEN SATURATION: 97 % | HEART RATE: 97 BPM | RESPIRATION RATE: 16 BRPM | BODY MASS INDEX: 36.16 KG/M2 | DIASTOLIC BLOOD PRESSURE: 100 MMHG | TEMPERATURE: 98.2 F | HEIGHT: 66 IN | SYSTOLIC BLOOD PRESSURE: 130 MMHG

## 2017-05-04 DIAGNOSIS — R10.84 GENERALIZED ABDOMINAL PAIN: Primary | ICD-10-CM

## 2017-05-04 RX ORDER — ONDANSETRON 4 MG/1
4 TABLET, ORALLY DISINTEGRATING ORAL
Qty: 30 TAB | Refills: 1 | Status: SHIPPED | OUTPATIENT
Start: 2017-05-04 | End: 2017-06-06 | Stop reason: ALTCHOICE

## 2017-05-04 RX ORDER — ONDANSETRON 4 MG/1
4 TABLET, ORALLY DISINTEGRATING ORAL
COMMUNITY
End: 2017-05-04 | Stop reason: SDUPTHER

## 2017-05-04 NOTE — PATIENT INSTRUCTIONS

## 2017-05-04 NOTE — PROGRESS NOTES
HISTORY OF PRESENT ILLNESS  Ajit Robert is a 51 y.o. male. HPI  Patient is here today for evaluation and treatment of: Abdominal Pain    Abdominal Pain: Pt has had abdominal pain for the last month. Pain is dull in nature; Occurs only with eating. Pt has + bloating . Pt was admitted to [facility] and discharged for the abdominal pain   He was seen by GI and had upper endoscopy; Upper Endoscopy was unremarkable per pt. Pt also was seen by cardiology at [facility]. The zofran has helped the nausea and vomiting. BP Readings from Last 3 Encounters:   05/04/17 (!) 130/100   04/06/17 120/90   04/03/17 (!) 145/110           He is also under the care of cardiology and is being considered for combined pacer / AICD; He has had chronically elevated BP. BP better at second check by this provider. Current Outpatient Prescriptions:     ondansetron (ZOFRAN ODT) 4 mg disintegrating tablet, Take 1 Tab by mouth every eight (8) hours as needed for Nausea., Disp: 30 Tab, Rfl: 1    pantoprazole (PROTONIX) 40 mg tablet, Take 1 Tab by mouth daily. , Disp: 30 Tab, Rfl: 4    lisinopril (PRINIVIL, ZESTRIL) 10 mg tablet, Take 1 Tab by mouth daily. , Disp: 30 Tab, Rfl: 6    furosemide (LASIX) 20 mg tablet, TAKE 1 TABLET BY MOUTH DAILY (Patient taking differently: TAKE 1 TABLET BY MOUTH IN THE MORNING AND TAKE 1 TABLET BY MOUTH IN THE EVENING.), Disp: 90 Tab, Rfl: 1    sertraline (ZOLOFT) 100 mg tablet, , Disp: , Rfl:     amlodipine (NORVASC) 5 mg tablet, Take 5 mg by mouth daily. , Disp: , Rfl:     isosorbide mononitrate ER (IMDUR) 60 mg CR tablet, Take  by mouth every morning., Disp: , Rfl:     carvedilol (COREG) 25 mg tablet, Take 25 mg by mouth two (2) times daily (with meals). , Disp: , Rfl:     hydralazine (APRESOLINE) 50 mg tablet, Take 50 mg by mouth three (3) times daily. , Disp: , Rfl:     Review of Systems   Constitutional: Negative for chills and fever. Cardiovascular: Negative for chest pain and palpitations. Gastrointestinal: Negative for constipation and diarrhea. Physical Exam   Constitutional: He appears well-developed and well-nourished. No distress. Cardiovascular: Normal rate and regular rhythm. Exam reveals no gallop and no friction rub. No murmur heard. Pulmonary/Chest: Breath sounds normal. No respiratory distress. He has no wheezes. He has no rales. Abdominal: Bowel sounds are normal. He exhibits no distension. There is no tenderness. There is no rebound and no guarding. Nursing note and vitals reviewed. Visit Vitals    BP (!) 130/100    Pulse 97    Temp 98.2 °F (36.8 °C) (Oral)    Resp 16    Ht 5' 6\" (1.676 m)    Wt 225 lb (102.1 kg)    SpO2 97%    BMI 36.32 kg/m2         ASSESSMENT and PLAN    ICD-10-CM ICD-9-CM    1. Generalized abdominal pain R10.84 789.07        As above, better with zofran and protonix  Refilled medications as ordered  Follow up with GI and cardiology as scheduled  Follow-up Disposition:  Return in about 3 months (around 8/4/2017) for abdominal pain. An After Visit Summary was printed and given to the patient. This has been fully explained to the patient, who indicates understanding.

## 2017-05-26 ENCOUNTER — PATIENT OUTREACH (OUTPATIENT)
Dept: FAMILY MEDICINE CLINIC | Age: 47
End: 2017-05-26

## 2017-05-30 ENCOUNTER — PATIENT OUTREACH (OUTPATIENT)
Dept: FAMILY MEDICINE CLINIC | Age: 47
End: 2017-05-30

## 2017-05-31 NOTE — PROGRESS NOTES
Left message on 5/30/17 for patient to return phone call to office. Appt scheduled not save in computer. Unsure of date chosen. Patient needs to reschedule PAUL.

## 2017-06-05 NOTE — PROGRESS NOTES
Spoke to patient on 17 at [phone] regarding hospitalization at [facility] 17 to 17. Patient doing well at time of call. Stated he is no longer on some medications as he was previously. Medication reconciliation performed with patient. Patient has a history of:    Chronic systolic heart failure  Stage D, NYHA class IIIB-IV  Nonischemic cardiomyopathy  EF 10%  Severe MR  Chronic renal insufficiency, stage 3  Abnormal liver function tests  History hypertension  Dyslipidemia  PTSD  Obesity, BMI 36    Exposure to lead in [facility] course as documented by Dr. Ed Cohn on  at [phone] (in [id number] [place] [address]):    50 y/o M who presented to [facility] on 17 for RHC with Dr Amanda Daniels with AHF; results below.  Pt was admitted for diuresis and MCS/Transplant evaluation by Dr. Patricia Mckinnon Milrinone was started.  Pt underwent full AHF evaluation. Pt underwent RHC again on 17 by Dr. Jimenez Alonso; results below.  Pt was weaned from milrinone and has been off since 17 and tolerated well.  At this point, the patient has received maximum benefit from hospitalization and is stable for discharge.  All new prescriptions have been given and outpatient follow-up appointments have been made. He was started on Revatio and will follow up with AHF clinic to complete any pre-certification paperwork for insurance under the direction of Dr Jimenez Alonso. Additionally, lifevest was recommended.  Life vest rep and casemanager working on obtaining coverage for lifevest as his medical coverage may have . He will f/u with AHF clinic for further monitoring and med titration.     Labs at time of discharge:    CBC(Brief) w/Diff     Lab Results   Component  Value  Date/Time     WBC X 10*3  7.8  2017 05:19 AM     HGB  13.9  2017 05:19 AM     HCT  42.9  2017 05:19 AM     MCV  81  2017 05:19 AM     PLATELET  048   05:19 AM        Basic Metabolic Profile  Lab Results    Component  Value  Date      NA  134  05/25/2017      POTASSIUM  4.3  05/25/2017      CHLORIDE  93*  05/25/2017      CO2  24  05/25/2017      BUN  30*  05/25/2017      CREAT  1.8*  05/25/2017      GLUCOSE  101*  05/25/2017      CALCIUM  9.6  05/25/2017      Leander Alejandraine 2.1  05/18/2017      PHOSPHORUS  3.3  12/27/2013                  Inpatient Consults:  Infectious Disease, Gastroenterology, Palliative Medicine, and Cardiology    Discharge diagnoses:       Acute on chronic systolic heart failure (HCC)  05/17/2017       Priority: A     NICM (nonischemic cardiomyopathy) ([facility])         Priority: B       A. Echo 12/23/13 Severe Global HypoK, EF 10-15%, Severe Central MR, Mod Diastolic Dysfunction, Mod AR/TR, RVSP 47mmHg  B. Card Cath 12/27/13 EF 15%, Nl Coronaries  C. Echo 5/8/14 Mod Conc LVH, Mod Global HypoK, EF 42%, Mild ALVIN, Trace MR/AR; Mild TR/MT, RVSP 39mmHg  D. Echo 7/6/15 Mod Conc LVH, EF 30-34%, Global HypoK, Mild ALVIN, Mild MR, Trace AR/TR/MT; RVSP 17mmHg  E.  Echo 5/18/17- EF 10%       Essential hypertension         Priority: C     Dyslipidemia         Priority: D     Obstructive sleep apnea on CPAP  04/22/2017       Priority: Susie Borges    Post traumatic stress disorder (PTSD)         Priority: F     Pulmonary hypertension (HCC)  05/19/2017     Obesity (BMI 30.0-34.9)  05/18/2017       Hospital visits in past 12 months: 2   ED visits in past 12 months: 6    Contacted patient for hospital follow up. Introduced self, role and reason for call. Verified 2 patient identifiers. Patient denied:  Weight increase, shortness of breath, chest pain    Patient reported the following on the patients ADL's:  Feeds self: yes  Ambulates: yes    Self grooming: yes  Toileting: yes    DME:  CPAP    Resources/Support:   Spouse    Appointments:   Follow up with [facility] clinic CRESENCIO Garibay on 5/31/17 @ 2:30pm    Follow up with Dr Sejal Johnston 5/31/17 @ 8:20am at [id number] [facility]    AMD: yes but not on file with this office. Scanned into [facility]'s EMR. Barriers to care  No barriers to care identified at this time. Adherence to previous treatment and likelihood for follow-up:  Patient verbalized understanding of discharge instructions and special follow up. Educated patient to monitor and report the following Red flags:swelling in hands, feet, legs or stomach\", \"cough or chest congestion\", \"increased fatigue or decrease in normal activity\", \"loss of appetite\", \"nausea\", \"stomach fullness or bloating\", \"feeling of restlessness or confusion\", \"shortness of breath\", \"lightheaded\" or any new or concerning symptoms. Patient verbalized understanding of information discussed and is aware of  when to seek medical attention from PCP, urgent care or ED. Reconciled home medications and reviewed allergies. Instructed to bring all medications with him to next appointment. Opportunity to ask questions was provided. Contact information was provided for future reference or further questions. Plan of Care/Goals:  Patient will weigh himself daily. Patient will reduce risks of CHF exacerbations.

## 2017-06-06 ENCOUNTER — OFFICE VISIT (OUTPATIENT)
Dept: FAMILY MEDICINE CLINIC | Age: 47
End: 2017-06-06

## 2017-06-06 VITALS
WEIGHT: 210 LBS | HEART RATE: 98 BPM | DIASTOLIC BLOOD PRESSURE: 82 MMHG | RESPIRATION RATE: 16 BRPM | HEIGHT: 66 IN | OXYGEN SATURATION: 98 % | TEMPERATURE: 98.2 F | BODY MASS INDEX: 33.75 KG/M2 | SYSTOLIC BLOOD PRESSURE: 106 MMHG

## 2017-06-06 DIAGNOSIS — I50.23 ACUTE ON CHRONIC SYSTOLIC CONGESTIVE HEART FAILURE (HCC): ICD-10-CM

## 2017-06-06 DIAGNOSIS — I27.20 PULMONARY HTN (HCC): Primary | ICD-10-CM

## 2017-06-06 RX ORDER — OMEPRAZOLE 20 MG/1
20 CAPSULE, DELAYED RELEASE ORAL
COMMUNITY
Start: 2017-05-25 | End: 2017-06-06

## 2017-06-06 RX ORDER — METRONIDAZOLE 500 MG/1
500 TABLET ORAL
COMMUNITY
Start: 2017-05-25 | End: 2017-06-06

## 2017-06-06 RX ORDER — BISMUTH SUBSALICYLATE 262 MG/1
524 TABLET, CHEWABLE ORAL
COMMUNITY
Start: 2017-05-25 | End: 2017-06-06

## 2017-06-06 RX ORDER — SILDENAFIL CITRATE 20 MG/1
10 TABLET ORAL
COMMUNITY
Start: 2017-05-25 | End: 2017-07-24

## 2017-06-06 RX ORDER — METOCLOPRAMIDE 5 MG/1
5 TABLET ORAL
COMMUNITY
Start: 2017-05-25 | End: 2017-12-12 | Stop reason: ALTCHOICE

## 2017-06-06 RX ORDER — POTASSIUM CHLORIDE 1500 MG/1
20 TABLET, FILM COATED, EXTENDED RELEASE ORAL
COMMUNITY
Start: 2017-05-11 | End: 2018-11-26

## 2017-06-06 RX ORDER — DOXYCYCLINE 100 MG/1
100 CAPSULE ORAL
COMMUNITY
Start: 2017-05-25 | End: 2017-06-06

## 2017-06-06 RX ORDER — CARVEDILOL 3.12 MG/1
6.25 TABLET ORAL
COMMUNITY
Start: 2017-05-31 | End: 2018-10-09

## 2017-06-06 RX ORDER — BUMETANIDE 1 MG/1
1 TABLET ORAL
COMMUNITY
Start: 2017-05-31 | End: 2017-09-06 | Stop reason: SDUPTHER

## 2017-06-06 NOTE — PROGRESS NOTES
1. Have you been to the ER, urgent care clinic since your last visit? Hospitalized since your last visit? yes here today to follow up    2. Have you seen or consulted any other health care providers outside of the [address] [place] [address] since your last visit? Include any pap smears or colon screening.  followed by Cardiology

## 2017-06-06 NOTE — PROGRESS NOTES
HISTORY OF PRESENT ILLNESS  Gerhardt Russel is a 51 y.o. male. HPI  Patient presented to the office today; Pt here for follow up on the hospitalization for acute on chronic systolic HF. Pt was admitted on 5/17/2017 and discharged on 5/25/2017. Pt presented with heart failure and was evaluated by the advanced HF group at [facility].   Pt was diuresed and his vasodilator therapy was adjusted. Pt had a right heart catheterization followed by an echocardiogram which revealed moderate to severe mitral regurg and moderate to severe pulmonary HTN. Pt was then placed on Revatio. Pt's other vasodilators were then discontinued as he developed SARAH after diuresis. Pts hospital discharge summary as well as his outpatient eval  On May 31, 2017 with Dr. Frank Vazquez have both been reviewed. Will see Dr. Frank Vazquez again in follow up in July. Today pt is feeling much better; He is not SOB and he is no longer having abdominal as he had prior to admission and diuresis. Pt has been contacted by our NN, Leslie Braxton since the time of discharge. Current Outpatient Prescriptions:     bismuth subsalicylate (PEPTO-BISMOL) 262 mg chew, 524 mg., Disp: , Rfl:     bumetanide (BUMEX) 1 mg tablet, 1 mg., Disp: , Rfl:     doxycycline (MONODOX) 100 mg capsule, 100 mg., Disp: , Rfl:     metoclopramide HCl (REGLAN) 5 mg tablet, 5 mg., Disp: , Rfl:     metroNIDAZOLE (FLAGYL) 500 mg tablet, 500 mg., Disp: , Rfl:     omeprazole (PRILOSEC) 20 mg capsule, 20 mg., Disp: , Rfl:     potassium chloride SR (K-TAB) 20 mEq tablet, 20 mEq., Disp: , Rfl:     sildenafil (REVATIO) 20 mg tablet, 10 mg., Disp: , Rfl:     carvedilol (COREG) 3.125 mg tablet, 6.25 mg., Disp: , Rfl:     sertraline (ZOLOFT) 100 mg tablet, , Disp: , Rfl:     Review of Systems   Constitutional: Negative for chills and fever. Cardiovascular: Negative for chest pain.        Physical Exam   Visit Vitals    /82 (BP 1 Location: Left arm, BP Patient Position: Sitting)    Pulse 98    Temp 98.2 °F (36.8 °C) (Oral)    Resp 16    Ht 5' 6\" (1.676 m)    Wt 210 lb (95.3 kg)    SpO2 98%    BMI 33.89 kg/m2     General appearance: alert, cooperative, no distress, appears stated age  Neck: supple, symmetrical, trachea midline, no adenopathy, thyroid: not enlarged, symmetric, no tenderness/mass/nodules, no carotid bruit and no JVD  Lungs: clear to auscultation bilaterally  Heart: regular rate and rhythm, S1, S2 normal, no murmur, click, rub or gallop  Abdomen: soft, non-tender. Bowel sounds normal. No masses,  no organomegaly  Extremities: extremities normal, atraumatic, no cyanosis or edema      ASSESSMENT and PLAN    ICD-10-CM ICD-9-CM    1. Pulmonary HTN (HCC) I27.2 416.8    2. Acute on chronic systolic congestive heart failure (HCC) I50.23 428.23      428.0        As above, pt well  Follow-up Disposition:  Return in about 3 months (around 9/6/2017) for htn. An After Visit Summary was printed and given to the patient. This has been fully explained to the patient, who indicates understanding.

## 2017-06-06 NOTE — PATIENT INSTRUCTIONS
Avoiding Triggers With Heart Failure: Care Instructions  Your Care Instructions  Triggers are anything that make your heart failure flare up. A flare-up is also called \"sudden heart failure\" or \"acute heart failure. \" When you have a flare-up, fluid builds up in your lungs, and you have problems breathing. You might need to go to the hospital. By watching for changes in your condition and avoiding triggers, you can prevent heart failure flare-ups. Follow-up care is a key part of your treatment and safety. Be sure to make and go to all appointments, and call your doctor if you are having problems. It's also a good idea to know your test results and keep a list of the medicines you take. How can you care for yourself at home? Watch for changes in your weight and condition  · Weigh yourself without clothing at the same time each day. Record your weight. Call your doctor if you gain 3 pounds or more in 2 to 3 days. A sudden weight gain may mean that your heart failure is getting worse. · Keep a daily record of your symptoms. Write down any changes in how you feel, such as new shortness of breath, cough, or problems eating. Also record if your ankles are more swollen than usual and if you have to urinate in the night more often. Note anything that you ate or did that could have triggered these changes. Limit sodium  Sodium causes your body to hold on to extra water. This may cause your heart failure symptoms to get worse. People get most of their sodium from processed foods. Fast food and restaurant meals also tend to be very high in sodium. · Your doctor may suggest that you limit sodium to 2,000 milligrams (mg) a day or less. That is less than 1 teaspoon of salt a day, including all the salt you eat in cooking or in packaged foods. · Read food labels on cans and food packages. They tell you how much sodium you get in one serving. Check the serving size.  If you eat more than one serving, you are getting more sodium. · Be aware that sodium can come in forms other than salt, including monosodium glutamate (MSG), sodium citrate, and sodium bicarbonate (baking soda). MSG is often added to Asian food. You can sometimes ask for food without MSG or salt. · Slowly reducing salt will help you adjust to the taste. Take the salt shaker off the table. · Flavor your food with garlic, lemon juice, onion, vinegar, herbs, and spices instead of salt. Do not use soy sauce, steak sauce, onion salt, garlic salt, mustard, or ketchup on your food, unless it is labeled \"low-sodium\" or \"low-salt. \"  · Make your own salad dressings, sauces, and ketchup without adding salt. · Use fresh or frozen ingredients, instead of canned ones, whenever you can. Choose low-sodium canned goods. · Eat less processed food and food from restaurants, including fast food. Exercise as directed  Moderate, regular exercise is very good for your heart. It improves your blood flow and helps control your weight. But too much exercise can stress your heart and cause a heart failure flare-up. · Check with your doctor before you start an exercise program.  · Walking is an easy way to get exercise. Start out slowly. Gradually increase the length and pace of your walk. Swimming, riding a bike, and using a treadmill are also good forms of exercise. · When you exercise, watch for signs that your heart is working too hard. You are pushing yourself too hard if you cannot talk while you are exercising. If you become short of breath or dizzy or have chest pain, stop, sit down, and rest.  · Do not exercise when you do not feel well. Take medicines correctly  · Take your medicines exactly as prescribed. Call your doctor if you think you are having a problem with your medicine. · Make a list of all the medicines you take. Include those prescribed to you by other doctors and any over-the-counter medicines, vitamins, or supplements you take.  Take this list with you when you go to any doctor. · Take your medicines at the same time every day. It may help you to post a list of all the medicines you take every day and what time of day you take them. · Make taking your medicine as simple as you can. Plan times to take your medicines when you are doing other things, such as eating a meal or getting ready for bed. This will make it easier to remember to take your medicines. · Get organized. Use helpful tools, such as daily or weekly pill containers. When should you call for help? Call 911 if you have symptoms of sudden heart failure such as:  · You have severe trouble breathing. · You cough up pink, foamy mucus. · You have a new irregular or rapid heartbeat. Call your doctor now or seek immediate medical care if:  · You have new or increased shortness of breath. · You are dizzy or lightheaded, or you feel like you may faint. · You have sudden weight gain, such as 3 pounds or more in 2 to 3 days. · You have increased swelling in your legs, ankles, or feet. · You are suddenly so tired or weak that you cannot do your usual activities. Watch closely for changes in your health, and be sure to contact your doctor if you develop new symptoms. Where can you learn more? Go to http://emiliano-madeleine.info/. Enter L914 in the search box to learn more about \"Avoiding Triggers With Heart Failure: Care Instructions. \"  Current as of: November 15, 2016  Content Version: 11.2  © 0530-4423 [employer]. Care instructions adapted under license by [employer] (which disclaims liability or warranty for this information). If you have questions about a medical condition or this instruction, always ask your healthcare professional. [place] [address] any warranty or liability for your use of this information.

## 2017-06-06 NOTE — MR AVS SNAPSHOT
Visit Information Date & Time Provider Department Dept. Phone Encounter #  
 6/6/2017 12:40 PM Qiana Corral, [address] [id number] Follow-up Instructions Return in about 3 months (around 9/6/2017) for htn. Upcoming Health Maintenance Date Due Pneumococcal 19-64 Medium Risk (1 of 1 - PPSV23) 9/25/1988 DTaP/Tdap/Td series (1 - Tdap) 9/25/1990 INFLUENZA AGE 9 TO ADULT 8/1/2017 Allergies as of 6/6/2017  Review Complete On: 5/4/2017 By: Qiana Corral MD  
 No Known Allergies Current Immunizations  Never Reviewed No immunizations on file. Not reviewed this visit You Were Diagnosed With   
  
 Codes Comments Pulmonary HTN ([facility] [address] [address]    -  Primary ICD-10-CM: I27.2 ICD-9-CM: 416.8 Acute on chronic systolic congestive heart failure (HCC)     ICD-10-CM: E66.33 ICD-9-CM: 428.23, 428.0 Vitals BP Pulse Temp Resp Height(growth percentile) Weight(growth percentile) 106/82 (BP 1 Location: Left arm, BP Patient Position: Sitting) 98 98.2 °F (36.8 °C) (Oral) 16 5' 6\" (1.676 m) 210 lb (95.3 kg) SpO2 BMI Smoking Status 98% 33.89 kg/m2 Never Smoker BMI and BSA Data Body Mass Index Body Surface Area  
 33.89 kg/m 2 2.11 m 2 Preferred Pharmacy Pharmacy Name Phone Clearance [facility], [address] [place] [address] Road [phone] Your Updated Medication List  
  
   
This list is accurate as of: 6/6/17  1:22 PM.  Always use your most recent med list.  
  
  
  
  
 bismuth subsalicylate 451 mg Chew Commonly known as:  PEPTO-BISMOL 524 mg.  
  
 bumetanide 1 mg tablet Commonly known as:  BUMEX  
1 mg.  
  
 carvedilol 3.125 mg tablet Commonly known as:  COREG  
6.25 mg.  
  
 doxycycline 100 mg capsule Commonly known as:  MONODOX  
100 mg.  
  
 metoclopramide HCl 5 mg tablet Commonly known as:  REGLAN  
5 mg.  
  
 metroNIDAZOLE 500 mg tablet Commonly known as:  FLAGYL  
500 mg.  
  
 omeprazole 20 mg capsule Commonly known as:  PRILOSEC  
20 mg.  
  
 potassium chloride SR 20 mEq tablet Commonly known as:  K-TAB 20 mEq.  
  
 sertraline 100 mg tablet Commonly known as:  ZOLOFT  
  
 sildenafil 20 mg tablet Commonly known as:  REVATIO 10 mg. Follow-up Instructions Return in about 3 months (around 9/6/2017) for htn. Patient Instructions Avoiding Triggers With Heart Failure: Care Instructions Your Care Instructions Triggers are anything that make your heart failure flare up. A flare-up is also called \"sudden heart failure\" or \"acute heart failure. \" When you have a flare-up, fluid builds up in your lungs, and you have problems breathing. You might need to go to the hospital. By watching for changes in your condition and avoiding triggers, you can prevent heart failure flare-ups. Follow-up care is a key part of your treatment and safety. Be sure to make and go to all appointments, and call your doctor if you are having problems. It's also a good idea to know your test results and keep a list of the medicines you take. How can you care for yourself at home? Watch for changes in your weight and condition · Weigh yourself without clothing at the same time each day. Record your weight. Call your doctor if you gain 3 pounds or more in 2 to 3 days. A sudden weight gain may mean that your heart failure is getting worse. · Keep a daily record of your symptoms. Write down any changes in how you feel, such as new shortness of breath, cough, or problems eating. Also record if your ankles are more swollen than usual and if you have to urinate in the night more often. Note anything that you ate or did that could have triggered these changes. Limit sodium Sodium causes your body to hold on to extra water. This may cause your heart failure symptoms to get worse.  People get most of their sodium from processed foods. Fast food and restaurant meals also tend to be very high in sodium. · Your doctor may suggest that you limit sodium to 2,000 milligrams (mg) a day or less. That is less than 1 teaspoon of salt a day, including all the salt you eat in cooking or in packaged foods. · Read food labels on cans and food packages. They tell you how much sodium you get in one serving. Check the serving size. If you eat more than one serving, you are getting more sodium. · Be aware that sodium can come in forms other than salt, including monosodium glutamate (MSG), sodium citrate, and sodium bicarbonate (baking soda). MSG is often added to Asian food. You can sometimes ask for food without MSG or salt. · Slowly reducing salt will help you adjust to the taste. Take the salt shaker off the table. · Flavor your food with garlic, lemon juice, onion, vinegar, herbs, and spices instead of salt. Do not use soy sauce, steak sauce, onion salt, garlic salt, mustard, or ketchup on your food, unless it is labeled \"low-sodium\" or \"low-salt. \" 
· Make your own salad dressings, sauces, and ketchup without adding salt. · Use fresh or frozen ingredients, instead of canned ones, whenever you can. Choose low-sodium canned goods. · Eat less processed food and food from restaurants, including fast food. Exercise as directed Moderate, regular exercise is very good for your heart. It improves your blood flow and helps control your weight. But too much exercise can stress your heart and cause a heart failure flare-up. · Check with your doctor before you start an exercise program. 
· Walking is an easy way to get exercise. Start out slowly. Gradually increase the length and pace of your walk. Swimming, riding a bike, and using a treadmill are also good forms of exercise. · When you exercise, watch for signs that your heart is working too hard.  You are pushing yourself too hard if you cannot talk while you are exercising. If you become short of breath or dizzy or have chest pain, stop, sit down, and rest. 
· Do not exercise when you do not feel well. Take medicines correctly · Take your medicines exactly as prescribed. Call your doctor if you think you are having a problem with your medicine. · Make a list of all the medicines you take. Include those prescribed to you by other doctors and any over-the-counter medicines, vitamins, or supplements you take. Take this list with you when you go to any doctor. · Take your medicines at the same time every day. It may help you to post a list of all the medicines you take every day and what time of day you take them. · Make taking your medicine as simple as you can. Plan times to take your medicines when you are doing other things, such as eating a meal or getting ready for bed. This will make it easier to remember to take your medicines. · Get organized. Use helpful tools, such as daily or weekly pill containers. When should you call for help? Call 911 if you have symptoms of sudden heart failure such as: 
· You have severe trouble breathing. · You cough up pink, foamy mucus. · You have a new irregular or rapid heartbeat. Call your doctor now or seek immediate medical care if: 
· You have new or increased shortness of breath. · You are dizzy or lightheaded, or you feel like you may faint. · You have sudden weight gain, such as 3 pounds or more in 2 to 3 days. · You have increased swelling in your legs, ankles, or feet. · You are suddenly so tired or weak that you cannot do your usual activities. Watch closely for changes in your health, and be sure to contact your doctor if you develop new symptoms. Where can you learn more? Go to http://emiliano-madeleine.info/. Enter O115 in the search box to learn more about \"Avoiding Triggers With Heart Failure: Care Instructions. \" Current as of: November 15, 2016 Content Version: 11.2 © 0081-9393 [employer]wise[employer]. Care instructions adapted under license by [employer] (which disclaims liability or warranty for this information). If you have questions about a medical condition or this instruction, always ask your healthcare professional. [address] any warranty or liability for your use of this information. Introducing [facility]! Nolanmarcella Avendaño introduces [employer] patient portal. Now you can access parts of your medical record, email your doctor's office, and request medication refills online. 1. In your internet browser, go to [email or web address] 2. Click on the First Time User? Click Here link in the Sign In box. You will see the New Member Sign Up page. 3. Enter your [employer] Access Code exactly as it appears below. You will not need to use this code after youve completed the sign-up process. If you do not sign up before the expiration date, you must request a new code. · [employer] Access Code: [email or web address] Expires: 6/19/2017  7:31 AM 
 
4. Enter the last four digits of your Social Security Number (xxxx) and Date of Birth (mm/dd/yyyy) as indicated and click Submit. You will be taken to the next sign-up page. 5. Create a Pixc ID. This will be your Pixc login ID and cannot be changed, so think of one that is secure and easy to remember. 6. Create a Pix[employer] password. You can change your password at any time. 7. Enter your Password Reset Question and Answer. This can be used at a later time if you forget your password. 8. Enter your e-mail address. You will receive e-mail notification when new information is available in [address] 9. Click Sign Up. You can now view and download portions of your medical record. 10. Click the Download Summary menu link to download a portable copy of your medical information.  
 
If you have questions, please visit the Frequently Asked Questions section of the [employer]. Remember, [employer] is NOT to be used for urgent needs. For medical emergencies, dial 911. Now available from your iPhone and Android! Please provide this summary of care documentation to your next provider. Your primary care clinician is listed as [address]. If you have any questions after today's visit, please call [phone].

## 2017-09-07 RX ORDER — BUMETANIDE 1 MG/1
1 TABLET ORAL DAILY
Qty: 30 TAB | Refills: 6 | Status: SHIPPED | OUTPATIENT
Start: 2017-09-07 | End: 2018-10-19

## 2017-12-12 ENCOUNTER — OFFICE VISIT (OUTPATIENT)
Dept: FAMILY MEDICINE CLINIC | Age: 47
End: 2017-12-12

## 2017-12-12 VITALS
DIASTOLIC BLOOD PRESSURE: 76 MMHG | OXYGEN SATURATION: 96 % | HEIGHT: 66 IN | HEART RATE: 77 BPM | RESPIRATION RATE: 16 BRPM | BODY MASS INDEX: 34.55 KG/M2 | WEIGHT: 215 LBS | SYSTOLIC BLOOD PRESSURE: 107 MMHG | TEMPERATURE: 97.9 F

## 2017-12-12 DIAGNOSIS — Z00.00 WELL ADULT EXAM: Primary | ICD-10-CM

## 2017-12-12 DIAGNOSIS — Z13.6 SCREENING FOR CARDIOVASCULAR CONDITION: ICD-10-CM

## 2017-12-12 RX ORDER — SILDENAFIL CITRATE 20 MG/1
20 TABLET ORAL 3 TIMES DAILY
COMMUNITY

## 2017-12-12 NOTE — PROGRESS NOTES
HISTORY OF PRESENT ILLNESS  Home Richey is a 49 y.o. male. HPI    ROS    Physical Exam    Duplicate info above    Subjective: Home Richey is a 49 y.o. male presenting for annual exam and complete physical.    C/o cold sx; Has mucous production with the cough; No fever; no wheeze    Patient Active Problem List    Diagnosis Date Noted    Pulmonary HTN     Hypertension     Sleep apnea     PTSD (post-traumatic stress disorder)     CHF (congestive heart failure) ([facility] 12/01/2013     Current Outpatient Prescriptions   Medication Sig Dispense Refill    sildenafil, antihypertensive, (REVATIO) 20 mg tablet Take 20 mg by mouth three (3) times daily.  bumetanide (BUMEX) 1 mg tablet Take 1 Tab by mouth daily. (Patient taking differently: Take 1 mg by mouth two (2) times a day.) 30 Tab 6    potassium chloride SR (K-TAB) 20 mEq tablet 20 mEq.  carvedilol (COREG) 3.125 mg tablet 6.25 mg. No Known Allergies  Past Medical History:   Diagnosis Date    CHF (congestive heart failure) ([address] [facility] 12/2013    [facility]    Hypertension     [facility])     [facility]    Pulmonary HTN     Sleep apnea      History reviewed. No pertinent surgical history. No family history on file. Social History   Substance Use Topics    Smoking status: Never Smoker    Smokeless tobacco: Never Used    Alcohol use Yes      Comment: Social drinker        Lab Results   Component Value Date/Time    Cholesterol, total 226 05/28/2015 10:27 AM    HDL Cholesterol 57 05/28/2015 10:27 AM    LDL, calculated 142 05/28/2015 10:27 AM    Triglyceride 137 05/28/2015 10:27 AM          Review of Systems  A comprehensive review of systems was negative except for that written in the HPI.     Objective:   Visit Vitals    /76    Pulse 77    Temp 97.9 °F (36.6 °C) (Oral)    Resp 16    Ht 5' 6\" (1.676 m)    Wt 215 lb (97.5 kg)    SpO2 96%    BMI 34.7 kg/m2     Physical exam:   General appearance - alert, well appearing, and in no distress  Ears - bilateral TM's and external ear canals normal  Nose - normal and patent, no erythema, discharge or polyps  Mouth - mucous membranes moist, pharynx normal without lesions  Neck - supple, no significant adenopathy  Lymphatics - no palpable lymphadenopathy, no hepatosplenomegaly  Chest - clear to auscultation, no wheezes, rales or rhonchi, symmetric air entry  Heart - normal rate, regular rhythm, normal S1, S2, no murmurs, rubs, clicks or gallops  Abdomen - soft, nontender, nondistended, no masses or organomegaly  Neurological - alert, oriented, normal speech, no focal findings or movement disorder noted  Musculoskeletal - no joint tenderness, deformity or swelling  Skin - normal coloration and turgor, no rashes, no suspicious skin lesions noted         ICD-10-CM ICD-9-CM    1. Well adult exam Z00.00 V70.0    2. Screening for cardiovascular condition Z13.6 V81.2 LIPID PANEL      METABOLIC PANEL, BASIC   . As above, pt well and stable  Followed by cardiology  Follow-up Disposition:  Return in about 6 months (around 6/12/2018) for HTN. .  Orders Placed This Encounter    LIPID PANEL    METABOLIC PANEL, BASIC    sildenafil, antihypertensive, (REVATIO) 20 mg tablet     Plenty of  Liquids for cold, consider flonase  Plain robitussin  Follow-up Disposition:  Return in about 6 months (around 6/12/2018) for HTN. An After Visit Summary was printed and given to the patient. This has been fully explained to the patient, who indicates understanding.

## 2017-12-12 NOTE — PATIENT INSTRUCTIONS
Well Visit, Ages 25 to 48: Care Instructions  Your Care Instructions    Physical exams can help you stay healthy. Your doctor has checked your overall health and may have suggested ways to take good care of yourself. He or she also may have recommended tests. At home, you can help prevent illness with healthy eating, regular exercise, and other steps. Follow-up care is a key part of your treatment and safety. Be sure to make and go to all appointments, and call your doctor if you are having problems. It's also a good idea to know your test results and keep a list of the medicines you take. How can you care for yourself at home? · Reach and stay at a healthy weight. This will lower your risk for many problems, such as obesity, diabetes, heart disease, and high blood pressure. · Get at least 30 minutes of physical activity on most days of the week. Walking is a good choice. You also may want to do other activities, such as running, swimming, cycling, or playing tennis or team sports. Discuss any changes in your exercise program with your doctor. · Do not smoke or allow others to smoke around you. If you need help quitting, talk to your doctor about stop-smoking programs and medicines. These can increase your chances of quitting for good. · Talk to your doctor about whether you have any risk factors for sexually transmitted infections (STIs). Having one sex partner (who does not have STIs and does not have sex with anyone else) is a good way to avoid these infections. · Use birth control if you do not want to have children at this time. Talk with your doctor about the choices available and what might be best for you. · Protect your skin from too much sun. When you're outdoors from 10 a.m. to 4 p.m., stay in the shade or cover up with clothing and a hat with a wide brim. Wear sunglasses that block UV rays. Even when it's cloudy, put broad-spectrum sunscreen (SPF 30 or higher) on any exposed skin.   · See a dentist one or two times a year for checkups and to have your teeth cleaned. · Wear a seat belt in the car. · Drink alcohol in moderation, if at all. That means no more than 2 drinks a day for men and 1 drink a day for women. Follow your doctor's advice about when to have certain tests. These tests can spot problems early. For everyone  · Cholesterol. Have the fat (cholesterol) in your blood tested after age 21. Your doctor will tell you how often to have this done based on your age, family history, or other things that can increase your risk for heart disease. · Blood pressure. Have your blood pressure checked during a routine doctor visit. Your doctor will tell you how often to check your blood pressure based on your age, your blood pressure results, and other factors. · Vision. Talk with your doctor about how often to have a glaucoma test.  · Diabetes. Ask your doctor whether you should have tests for diabetes. · Colon cancer. Have a test for colon cancer at age 48. You may have one of several tests. If you are younger than 48, you may need a test earlier if you have any risk factors. Risk factors include whether you already had a precancerous polyp removed from your colon or whether your parent, brother, sister, or child has had colon cancer. For women  · Breast exam and mammogram. Talk to your doctor about when you should have a clinical breast exam and a mammogram. Medical experts differ on whether and how often women under 50 should have these tests. Your doctor can help you decide what is right for you. · Pap test and pelvic exam. Begin Pap tests at age 24. A Pap test is the best way to find cervical cancer. The test often is part of a pelvic exam. Ask how often to have this test.  · Tests for sexually transmitted infections (STIs). Ask whether you should have tests for STIs. You may be at risk if you have sex with more than one person, especially if your partners do not wear condoms.   For men  · Tests for sexually transmitted infections (STIs). Ask whether you should have tests for STIs. You may be at risk if you have sex with more than one person, especially if you do not wear a condom. · Testicular cancer exam. Ask your doctor whether you should check your testicles regularly. · Prostate exam. Talk to your doctor about whether you should have a blood test (called a PSA test) for prostate cancer. Experts differ on whether and when men should have this test. Some experts suggest it if you are older than 39 and are -American or have a father or brother who got prostate cancer when he was younger than 72. When should you call for help? Watch closely for changes in your health, and be sure to contact your doctor if you have any problems or symptoms that concern you. Where can you learn more? Go to http://emiliano-madeleine.info/. Enter P072 in the search box to learn more about \"Well Visit, Ages 25 to 48: Care Instructions. \"  Current as of: May 12, 2017  Content Version: 11.4  © 0753-1743 [employer]. Care instructions adapted under license by [employer] (which disclaims liability or warranty for this information). If you have questions about a medical condition or this instruction, always ask your healthcare professional. [place] [address] any warranty or liability for your use of this information. Prostate Cancer Screening: Care Instructions  Your Care Instructions    The prostate gland is an organ found just below a man's bladder. It is the size and shape of a walnut. It surrounds the tube that carries urine from the bladder out of the body through the penis. This tube is called the urethra. Prostate cancer is the abnormal growth of cells in the prostate. It is the second most common type of cancer in men. (Skin cancer is the most common.)  Most cases of prostate cancer occur in men older than 72.  The disease runs in families. And it's more common in -American men. When it's found and treated early, prostate cancer may be cured. But it is not always treated. This is because prostate cancer may not shorten your life, especially if you are older and the cancer is growing slowly. Follow-up care is a key part of your treatment and safety. Be sure to make and go to all appointments, and call your doctor if you are having problems. It's also a good idea to know your test results and keep a list of the medicines you take. What are the screening tests for prostate cancer? The main screening test for prostate cancer is the prostate-specific antigen (PSA) test. This is a blood test that measures how much PSA is in your blood. A high level may mean that you have an enlargement, an infection, or cancer. Along with the PSA test, you may have a digital rectal exam. The digital (finger) rectal exam checks for anything abnormal in your prostate. To do the exam, the doctor puts a lubricated, gloved finger into your rectum. If these tests suggest cancer, you may need a prostate biopsy. How is prostate cancer diagnosed? In a biopsy, the doctor takes small tissue samples from your prostate gland. Another doctor then looks at the tissue under a microscope to see if there are cancer cells, signs of infection, or other problems. The results help diagnose prostate cancer. What are the pros and cons of screening? Neither a PSA test nor a digital rectal exam can tell you for sure that you do or do not have cancer. But they can help you decide if you need more tests, such as a prostate biopsy. Screening tests may be useful because most men with prostate cancer don't have symptoms. It can be hard to know if you have cancer until it is more advanced. And then it's harder to treat. But having a PSA test can also cause harm. The test may show high levels of PSA that aren't caused by cancer.  So you could have a prostate biopsy you didn't need. Or the PSA test might be normal when there is cancer, so a cancer might not be found early. The test can also find cancers that would never have caused a problem during your lifetime. So you might have treatment that was not needed. Prostate cancer usually develops late in life and grows slowly. For many men, it does not shorten their lives. Some experts advise screening only for men who are at high risk. Talk with your doctor to see if screening is right for you. Where can you learn more? Go to http://emiliano-madeleine.info/. Enter R550 in the search box to learn more about \"Prostate Cancer Screening: Care Instructions. \"  Current as of: May 12, 2017  Content Version: 11.4  © 5039-4681 [employer]. Care instructions adapted under license by [employer] (which disclaims liability or warranty for this information). If you have questions about a medical condition or this instruction, always ask your healthcare professional. [facility] any warranty or liability for your use of this information.

## 2017-12-12 NOTE — PROGRESS NOTES
Pt is here for CPE    1. Have you been to the ER, urgent care clinic since your last visit? [facility] since your last visit? [facility]  [facility]    2. Have you seen or consulted any other health care providers outside of the [address] [place] [address] since your last visit? Include any pap smears or colon screening. Yes Dr Kelly Wilson, Cardiology 12/7/17      Pt declines Flu vaccine.

## 2017-12-12 NOTE — MR AVS SNAPSHOT
Visit Information Date & Time Provider Department Dept. Phone Encounter #  
 12/12/2017  2:40 PM Cielo Pepe, [facility] Zeenat [place] [facility] [phone] [id number] Follow-up Instructions Return in about 6 months (around 6/12/2018) for HTN. Upcoming Health Maintenance Date Due DTaP/Tdap/Td series (2 - Td) 5/1/2027 Allergies as of 12/12/2017  Review Complete On: 12/12/2017 By: Dana Zhou LPN No Known Allergies Current Immunizations  Reviewed on 12/12/2017 No immunizations on file. Reviewed by Cielo Pepe MD on 12/12/2017 at  3:16 PM  
You Were Diagnosed With   
  
 Codes Comments Well adult exam    -  Primary ICD-10-CM: Z00.00 ICD-9-CM: V70.0 Screening for cardiovascular condition     ICD-10-CM: Z13.6 ICD-9-CM: V81.2 Vitals BP Pulse Temp Resp Height(growth percentile) Weight(growth percentile) 107/76 77 97.9 °F (36.6 °C) (Oral) 16 5' 6\" (1.676 m) 215 lb (97.5 kg) SpO2 BMI Smoking Status 96% 34.7 kg/m2 Never Smoker BMI and BSA Data Body Mass Index Body Surface Area 34.7 kg/m 2 2.13 m 2 Preferred Pharmacy Pharmacy Name Phone [facility] [address] [facility], [address] [place] [address] Road [phone] Your Updated Medication List  
  
   
This list is accurate as of: 12/12/17  3:21 PM.  Always use your most recent med list.  
  
  
  
  
 bumetanide 1 mg tablet Commonly known as:  Nandini Bolus Take 1 Tab by mouth daily. carvedilol 3.125 mg tablet Commonly known as:  COREG  
6.25 mg.  
  
 potassium chloride SR 20 mEq tablet Commonly known as:  K-TAB 20 mEq. REVATIO 20 mg tablet Generic drug:  sildenafil (antihypertensive) Take 20 mg by mouth three (3) times daily. Follow-up Instructions Return in about 6 months (around 6/12/2018) for HTN. To-Do List   
 12/26/2017 Lab:  LIPID PANEL   
  
 12/26/2017 Lab: METABOLIC PANEL, BASIC Patient Instructions Well Visit, Ages 25 to 48: Care Instructions Your Care Instructions Physical exams can help you stay healthy. Your doctor has checked your overall health and may have suggested ways to take good care of yourself. He or she also may have recommended tests. At home, you can help prevent illness with healthy eating, regular exercise, and other steps. Follow-up care is a key part of your treatment and safety. Be sure to make and go to all appointments, and call your doctor if you are having problems. It's also a good idea to know your test results and keep a list of the medicines you take. How can you care for yourself at home? · Reach and stay at a healthy weight. This will lower your risk for many problems, such as obesity, diabetes, heart disease, and high blood pressure. · Get at least 30 minutes of physical activity on most days of the week. Walking is a good choice. You also may want to do other activities, such as running, swimming, cycling, or playing tennis or team sports. Discuss any changes in your exercise program with your doctor. · Do not smoke or allow others to smoke around you. If you need help quitting, talk to your doctor about stop-smoking programs and medicines. These can increase your chances of quitting for good. · Talk to your doctor about whether you have any risk factors for sexually transmitted infections (STIs). Having one sex partner (who does not have STIs and does not have sex with anyone else) is a good way to avoid these infections. · Use birth control if you do not want to have children at this time. Talk with your doctor about the choices available and what might be best for you. · Protect your skin from too much sun. When you're outdoors from 10 a.m. to 4 p.m., stay in the shade or cover up with clothing and a hat with a wide brim. Wear sunglasses that block UV rays.  Even when it's cloudy, put broad-spectrum sunscreen (SPF 30 or higher) on any exposed skin. · See a dentist one or two times a year for checkups and to have your teeth cleaned. · Wear a seat belt in the car. · Drink alcohol in moderation, if at all. That means no more than 2 drinks a day for men and 1 drink a day for women. Follow your doctor's advice about when to have certain tests. These tests can spot problems early. For everyone · Cholesterol. Have the fat (cholesterol) in your blood tested after age 21. Your doctor will tell you how often to have this done based on your age, family history, or other things that can increase your risk for heart disease. · Blood pressure. Have your blood pressure checked during a routine doctor visit. Your doctor will tell you how often to check your blood pressure based on your age, your blood pressure results, and other factors. · Vision. Talk with your doctor about how often to have a glaucoma test. 
· Diabetes. Ask your doctor whether you should have tests for diabetes. · Colon cancer. Have a test for colon cancer at age 48. You may have one of several tests. If you are younger than 48, you may need a test earlier if you have any risk factors. Risk factors include whether you already had a precancerous polyp removed from your colon or whether your parent, brother, sister, or child has had colon cancer. For women · Breast exam and mammogram. Talk to your doctor about when you should have a clinical breast exam and a mammogram. Medical experts differ on whether and how often women under 50 should have these tests. Your doctor can help you decide what is right for you. · Pap test and pelvic exam. Begin Pap tests at age 24. A Pap test is the best way to find cervical cancer. The test often is part of a pelvic exam. Ask how often to have this test. 
· Tests for sexually transmitted infections (STIs).  Ask whether you should have tests for STIs. You may be at risk if you have sex with more than one person, especially if your partners do not wear condoms. For men · Tests for sexually transmitted infections (STIs). Ask whether you should have tests for STIs. You may be at risk if you have sex with more than one person, especially if you do not wear a condom. · Testicular cancer exam. Ask your doctor whether you should check your testicles regularly. · Prostate exam. Talk to your doctor about whether you should have a blood test (called a PSA test) for prostate cancer. Experts differ on whether and when men should have this test. Some experts suggest it if you are older than 39 and are -American or have a father or brother who got prostate cancer when he was younger than 72. When should you call for help? Watch closely for changes in your health, and be sure to contact your doctor if you have any problems or symptoms that concern you. Where can you learn more? Go to http://emiliano-madeleine.info/. Enter P072 in the search box to learn more about \"Well Visit, Ages 25 to 48: Care Instructions. \" Current as of: May 12, 2017 Content Version: 11.4 © 4477-8230 [employer]. Care instructions adapted under license by [employer] (which disclaims liability or warranty for this information). If you have questions about a medical condition or this instruction, always ask your healthcare professional. [address] any warranty or liability for your use of this information. Prostate Cancer Screening: Care Instructions Your Care Instructions The prostate gland is an organ found just below a man's bladder. It is the size and shape of a walnut. It surrounds the tube that carries urine from the bladder out of the body through the penis. This tube is called the urethra. Prostate cancer is the abnormal growth of cells in the prostate.  It is the second most common type of cancer in men. (Skin cancer is the most common.) Most cases of prostate cancer occur in men older than 72. The disease runs in families. And it's more common in -American men. When it's found and treated early, prostate cancer may be cured. But it is not always treated. This is because prostate cancer may not shorten your life, especially if you are older and the cancer is growing slowly. Follow-up care is a key part of your treatment and safety. Be sure to make and go to all appointments, and call your doctor if you are having problems. It's also a good idea to know your test results and keep a list of the medicines you take. What are the screening tests for prostate cancer? The main screening test for prostate cancer is the prostate-specific antigen (PSA) test. This is a blood test that measures how much PSA is in your blood. A high level may mean that you have an enlargement, an infection, or cancer. Along with the PSA test, you may have a digital rectal exam. The digital (finger) rectal exam checks for anything abnormal in your prostate. To do the exam, the doctor puts a lubricated, gloved finger into your rectum. If these tests suggest cancer, you may need a prostate biopsy. How is prostate cancer diagnosed? In a biopsy, the doctor takes small tissue samples from your prostate gland. Another doctor then looks at the tissue under a microscope to see if there are cancer cells, signs of infection, or other problems. The results help diagnose prostate cancer. What are the pros and cons of screening? Neither a PSA test nor a digital rectal exam can tell you for sure that you do or do not have cancer. But they can help you decide if you need more tests, such as a prostate biopsy. Screening tests may be useful because most men with prostate cancer don't have symptoms. It can be hard to know if you have cancer until it is more advanced. And then it's harder to treat. But having a PSA test can also cause harm. The test may show high levels of PSA that aren't caused by cancer. So you could have a prostate biopsy you didn't need. Or the PSA test might be normal when there is cancer, so a cancer might not be found early. The test can also find cancers that would never have caused a problem during your lifetime. So you might have treatment that was not needed. Prostate cancer usually develops late in life and grows slowly. For many men, it does not shorten their lives. Some experts advise screening only for men who are at high risk. Talk with your doctor to see if screening is right for you. Where can you learn more? Go to http://emiliano-madeleine.info/. Enter R550 in the search box to learn more about \"Prostate Cancer Screening: Care Instructions. \" Current as of: May 12, 2017 Content Version: 11.4 © 5109-3739 [employer]. Care instructions adapted under license by [employer] (which disclaims liability or warranty for this information). If you have questions about a medical condition or this instruction, always ask your healthcare professional. [address] any warranty or liability for your use of this information. Introducing [facility]! Angelia Patterson introduces [employer] patient portal. Now you can access parts of your medical record, email your doctor's office, and request medication refills online. 1. In your internet browser, go to [email or web address] 2. Click on the First Time User? Click Here link in the Sign In box. You will see the New Member Sign Up page. 3. Enter your Puzzlium Access Code exactly as it appears below. You will not need to use this code after youve completed the sign-up process. If you do not sign up before the expiration date, you must request a new code. · Pu[employer]lium Access Code: [email or web address] Expires: 3/12/2018  2:32 PM 
 
 4. Enter the last four digits of your Social Security Number (xxxx) and Date of Birth (mm/dd/yyyy) as indicated and click Submit. You will be taken to the next sign-up page. 5. Create a [employer] ID. This will be your [employer] login ID and cannot be changed, so think of one that is secure and easy to remember. 6. Create a [employer] password. You can change your password at any time. 7. Enter your Password Reset Question and Answer. This can be used at a later time if you forget your password. 8. Enter your e-mail address. You will receive e-mail notification when new information is available in [address] 9. Click Sign Up. You can now view and download portions of your medical record. 10. Click the Download Summary menu link to download a portable copy of your medical information. If you have questions, please visit the Frequently Asked Questions section of the [employer] website. Remember, [employer] is NOT to be used for urgent needs. For medical emergencies, dial 911. Now available from your iPhone and Android! Please provide this summary of care documentation to your next provider. Your primary care clinician is listed as [address]. If you have any questions after today's visit, please call [phone].

## 2018-10-09 ENCOUNTER — OFFICE VISIT (OUTPATIENT)
Dept: FAMILY MEDICINE CLINIC | Age: 48
End: 2018-10-09

## 2018-10-09 ENCOUNTER — HOSPITAL ENCOUNTER (OUTPATIENT)
Dept: LAB | Age: 48
Discharge: HOME | End: 2018-10-09
Payer: MEDICARE

## 2018-10-09 VITALS
BODY MASS INDEX: 34.84 KG/M2 | HEIGHT: 66 IN | DIASTOLIC BLOOD PRESSURE: 84 MMHG | OXYGEN SATURATION: 98 % | SYSTOLIC BLOOD PRESSURE: 134 MMHG | RESPIRATION RATE: 20 BRPM | HEART RATE: 94 BPM | WEIGHT: 216.8 LBS | TEMPERATURE: 97.6 F

## 2018-10-09 DIAGNOSIS — K21.9 GASTROESOPHAGEAL REFLUX DISEASE, ESOPHAGITIS PRESENCE NOT SPECIFIED: Primary | ICD-10-CM

## 2018-10-09 DIAGNOSIS — E87.6 HYPOKALEMIA: ICD-10-CM

## 2018-10-09 DIAGNOSIS — R07.89 CHEST PRESSURE: ICD-10-CM

## 2018-10-09 LAB
ANION GAP SERPL CALC-SCNC: 11 MMOL/L (ref 3–18)
BUN SERPL-MCNC: 21 MG/DL (ref 7–18)
BUN/CREAT SERPL: 12 (ref 12–20)
CALCIUM SERPL-MCNC: 8.8 MG/DL (ref 8.5–10.1)
CHLORIDE SERPL-SCNC: 104 MMOL/L (ref 100–108)
CO2 SERPL-SCNC: 26 MMOL/L (ref 21–32)
CREAT SERPL-MCNC: 1.77 MG/DL (ref 0.6–1.3)
GLUCOSE SERPL-MCNC: 120 MG/DL (ref 74–99)
POTASSIUM SERPL-SCNC: 4.1 MMOL/L (ref 3.5–5.5)
SODIUM SERPL-SCNC: 141 MMOL/L (ref 136–145)

## 2018-10-09 PROCEDURE — 80048 BASIC METABOLIC PNL TOTAL CA: CPT | Performed by: NURSE PRACTITIONER

## 2018-10-09 PROCEDURE — 36415 COLL VENOUS BLD VENIPUNCTURE: CPT | Performed by: NURSE PRACTITIONER

## 2018-10-09 RX ORDER — CARVEDILOL 6.25 MG/1
TABLET ORAL 2 TIMES DAILY WITH MEALS
COMMUNITY
End: 2019-01-18 | Stop reason: SDUPTHER

## 2018-10-09 RX ORDER — OMEPRAZOLE 20 MG/1
20 CAPSULE, DELAYED RELEASE ORAL DAILY
Qty: 30 CAP | Refills: 3 | Status: SHIPPED | OUTPATIENT
Start: 2018-10-09 | End: 2019-03-11

## 2018-10-09 NOTE — PROGRESS NOTES
Subjective: Ishmale Pozo is a 51 y.o. male with \"stomach pain\" since Thursday in patient with CHF, HTN, Pulmonary hypertension and ANGEL. He reports that he has been experiencing pain in stomach since he has been taking Potassium Chloride ER 20 meq daily prescribed by Dr. Sergio Clemente his cardiologist.  He reports that he did call her cardiologist but was told that it was not related. He also reports nausea and vomiting last night. He reports excess gas daily since on this formulation of potassium. Diarrhea reported numerous times on Thursday and several times daily since. He reports that he cannot tolerate this medication. Has been on immediate release of potassium in past and able to tolerate. He has not taken the medication since last Thursday. He reports that he has continued to experience excess flatus moderate pressure pain today in chest on right and abdomen generally non-radiating,nausea and vomiting last night,  ROS denies blood in stool, denies nausea or vomiting today, +burning brashy taste in back of throat. PMH: reviewed medications and allergy lists and medical and family history. Wt Readings from Last 3 Encounters:   10/09/18 216 lb 12.8 oz (98.3 kg)   12/12/17 215 lb (97.5 kg)   06/06/17 210 lb (95.3 kg)     BP Readings from Last 3 Encounters:   10/09/18 134/84   12/12/17 107/76   06/06/17 106/82     OBJECTIVE:  Awake and alert in no acute distress  Obese casually dressed  Lungs clear throughout  S1 S2 RRR without ectopy or murmur auscultated. Abdomen: obese abdomen, normoactive bowel sounds all quadrants, no tenderness to abdomen upper and lower quadrants, +TTP epigastric region, no masses to palpation no rebound tenderness.  No hepatosplenomegaly  Extremities: no clubbing, cyanosis, peripheral edema  Visit Vitals    /84 (BP 1 Location: Left arm, BP Patient Position: Sitting)    Pulse 94    Temp 97.6 °F (36.4 °C) (Oral)    Resp 20    Ht 5' 6\" (1.676 m)    Wt 216 lb 12.8 oz (98.3 kg)    SpO2 98%    BMI 34.99 kg/m2   POC EKG: NSR no acute STT changes LBBB with right axis  Compared with [id number] no changes LBBB NSR   Diagnoses and all orders for this visit:    1. Gastroesophageal reflux disease, esophagitis presence not specified  -     omeprazole (PRILOSEC) 20 mg capsule; Take 1 Cap by mouth daily. 2. Hypokalemia  -     METABOLIC PANEL, BASIC; Future    3. Chest pressure  -     AMB POC EKG ROUTINE W/ 12 LEADS, INTER & REP    Anticipatory guidance given  Follow up with PCP for health maintenance  Will call patient regarding K level when resulted  I have discussed the diagnosis with the patient and the intended plan as seen in the above orders. The patient has received an after-visit summary and questions were answered concerning future plans. I have discussed medication side effects and warnings with the patient as well.     Follow-up Disposition:  Return if symptoms worsen or fail to improve, for make appointment with PCP for health maintenance Wellness exam.

## 2018-10-09 NOTE — MR AVS SNAPSHOT
[facility] 
 
 
 [address] [address] 
[phone] Patient: Aster Almazan MRN: [id number] DDV:[id number] Visit Information Date & Time Provider Department Dept. Phone Encounter #  
 10/9/2018  8:20 AM Job Lin NP [facility] [phone] [id number] Follow-up Instructions Return if symptoms worsen or fail to improve, for make appointment with PCP for health maintenance Wellness exam. Upcoming Health Maintenance Date Due  
 MEDICARE YEARLY EXAM 3/14/2018 Influenza Age 5 to Adult 8/1/2018 DTaP/Tdap/Td series (2 - Td) 5/1/2027 Allergies as of 10/9/2018  Review Complete On: 10/9/2018 By: Emelia Sood No Known Allergies Current Immunizations  Reviewed on 12/12/2017 No immunizations on file. Not reviewed this visit You Were Diagnosed With   
  
 Codes Comments Gastroesophageal reflux disease, esophagitis presence not specified    -  Primary ICD-10-CM: K21.9 ICD-9-CM: 530.81 Hypokalemia     ICD-10-CM: E87.6 ICD-9-CM: 276.8 Chest pressure     ICD-10-CM: R07.89 ICD-9-CM: 786.59 Vitals BP Pulse Temp Resp Height(growth percentile) Weight(growth percentile) 134/84 (BP 1 Location: Left arm, BP Patient Position: Sitting) 94 97.6 °F (36.4 °C) (Oral) 20 5' 6\" (1.676 m) 216 lb 12.8 oz (98.3 kg) SpO2 BMI Smoking Status 98% 34.99 kg/m2 Never Smoker BMI and BSA Data Body Mass Index Body Surface Area 34.99 kg/m 2 2.14 m 2 Preferred Pharmacy Pharmacy Name Phone [address], [address] [phone] Your Updated Medication List  
  
   
This list is accurate as of 10/9/18  9:11 AM.  Always use your most recent med list.  
  
  
  
  
 bumetanide 1 mg tablet Commonly known as:  Shruthi Dodsonbes Take 1 Tab by mouth daily. carvedilol 6.25 mg tablet Commonly known as:  Kim Patel  
 Take  by mouth two (2) times daily (with meals). ENTRESTO PO Take  by mouth. omeprazole 20 mg capsule Commonly known as:  PRILOSEC Take 1 Cap by mouth daily. potassium chloride SR 20 mEq tablet Commonly known as:  K-TAB 20 mEq. REVATIO 20 mg tablet Generic drug:  sildenafil (antihypertensive) Take 20 mg by mouth three (3) times daily. Prescriptions Sent to Pharmacy Refills  
 omeprazole (PRILOSEC) 20 mg capsule 3 Sig: Take 1 Cap by mouth daily. Class: Normal  
 Pharmacy: [facility] [address] [place] [address] Ph #: [phone] Route: Oral  
  
We Performed the Following AMB POC EKG ROUTINE W/ 12 LEADS, INTER & REP [65843 CPT(R)] Follow-up Instructions Return if symptoms worsen or fail to improve, for make appointment with PCP for health maintenance Wellness exam. To-Do List   
 10/09/2018 Lab:  METABOLIC PANEL, BASIC Patient Instructions Gastroesophageal Reflux Disease (GERD): Care Instructions Your Care Instructions Gastroesophageal reflux disease (GERD) is the backward flow of stomach acid into the esophagus. The esophagus is the tube that leads from your throat to your stomach. A one-way valve prevents the stomach acid from moving up into this tube. When you have GERD, this valve does not close tightly enough. If you have mild GERD symptoms including heartburn, you may be able to control the problem with antacids or over-the-counter medicine. Changing your diet, losing weight, and making other lifestyle changes can also help reduce symptoms. Follow-up care is a key part of your treatment and safety. Be sure to make and go to all appointments, and call your doctor if you are having problems. It's also a good idea to know your test results and keep a list of the medicines you take. How can you care for yourself at home? · Take your medicines exactly as prescribed. Call your doctor if you think you are having a problem with your medicine. · Your doctor may recommend over-the-counter medicine. For mild or occasional indigestion, antacids, such as Tums, Gaviscon, Mylanta, or Maalox, may help. Your doctor also may recommend over-the-counter acid reducers, such as Pepcid AC, Tagamet HB, Zantac 75, or Prilosec. Read and follow all instructions on the label. If you use these medicines often, talk with your doctor. · Change your eating habits. ¨ It's best to eat several small meals instead of two or three large meals. ¨ After you eat, wait 2 to 3 hours before you lie down. ¨ Chocolate, mint, and alcohol can make GERD worse. ¨ Spicy foods, foods that have a lot of acid (like tomatoes and oranges), and coffee can make GERD symptoms worse in some people. If your symptoms are worse after you eat a certain food, you may want to stop eating that food to see if your symptoms get better. · Do not smoke or chew tobacco. Smoking can make GERD worse. If you need help quitting, talk to your doctor about stop-smoking programs and medicines. These can increase your chances of quitting for good. · If you have GERD symptoms at night, raise the head of your bed 6 to 8 inches by putting the frame on blocks or placing a foam wedge under the head of your mattress. (Adding extra pillows does not work.) · Do not wear tight clothing around your middle. · Lose weight if you need to. Losing just 5 to 10 pounds can help. When should you call for help? Call your doctor now or seek immediate medical care if: 
  · You have new or different belly pain.  
  · Your stools are black and tarlike or have streaks of blood.  
 Watch closely for changes in your health, and be sure to contact your doctor if: 
  · Your symptoms have not improved after 2 days.  
  · Food seems to catch in your throat or chest.  
Where can you learn more? Go to http://emiliano-madeleine.info/. Enter A458 in the search box to learn more about \"Gastroesophageal Reflux Disease (GERD): Care Instructions. \" Current as of: March 28, 2018 Content Version: 11.8 © 5293-5833 judge.me. Care instructions adapted under license by [employer] (which disclaims liability or warranty for this information). If you have questions about a medical condition or this instruction, always ask your healthcare professional. [address] any warranty or liability for your use of this information. Omeprazole (By mouth) Omeprazole ([id number]-[id number]) Treats heartburn, a damaged esophagus, stomach ulcers, and gastroesophageal reflux disease (GERD). This medicine is a proton pump inhibitor (PPI). Brand Name(s): First-Omeprazole, [facility] Omeprazole, Good Sense Omeprazole, Leader Omeprazole, Omeclamox-Willie, PrevidolRx Analgesic Willie, PriLOSEC, PriLOSEC OTC, Quality Choice Omeprazole Magnesium, [facility] Omeprazole, Sunmark Omeprazole, TopCare Omeprazole There may be other brand names for this medicine. When This Medicine Should Not Be Used: This medicine is not right for everyone. Do not use it if you had an allergic reaction to omeprazole or similar medicines. How to Use This Medicine:  
Delayed Release Capsule, Packet, Powder for Suspension, Delayed Release Tablet · Your doctor will tell you how much medicine to use. Do not use more than directed. · This medicine should come with a Medication Guide. Ask your pharmacist for a copy if you do not have one. · Follow the instructions on the medicine label if you are using this medicine without a prescription. If you are using the over-the-counter medicine, do not take it for more than 14 days or use the treatment more often than every 4 months unless your doctor tells you to.  
· Take this medicine at least 1 hour before a meal and for the full time of treatment, even if you begin to feel better after a few days. · Capsule or tablet: ¨ Swallow whole. Do not crush or chew it. ¨ If you cannot swallow the capsule, you may open it and pour the medicine into a small amount of applesauce. Stir this mixture well and swallow it without chewing. To help make sure you get the full dose, drink a full glass of water. · Oral packet: ¨ Mix the contents of a 2.5-milligram (mg) packet with 5 milliliters (mL) of water or mix the contents of a 10-mg packet with 15 mL of water. Do not use other liquids or food. ¨ Stir well. Let the mixture thicken for 2 to 3 minutes. ¨ Stir again and drink the medicine within 30 minutes. ¨ If there is any medicine left, add more water, stir, and drink immediately. § To prepare the oral packet for a feeding tube: § Add 5 mL of water to a catheter-tipped syringe. Then add the contents of a 2.5-mg packet (or use 15 mL of water for the 10-mg packet). § Shake the syringe right away. Let the mixture thicken for 2 to 3 minutes. § Shake the syringe once more. Give the medicine through the tube within 30 minutes. § Refill the syringe with an equal amount of water and shake it. Use the water to flush the tube to make sure all the medicine is given. · Missed dose: Take a dose as soon as you remember. If it is almost time for your next dose, wait until then and take a regular dose. Do not take extra medicine to make up for a missed dose. · Store the medicine in a closed container at room temperature, away from heat, moisture, and direct light. Drugs and Foods to Avoid: Ask your doctor or pharmacist before using any other medicine, including over-the-counter medicines, vitamins, and herbal products. · Do not use omeprazole if you are also using medicines that contain rilpivirine. · Some foods and medicines can affect how omeprazole works. Tell your doctor if you are using any of the following: ¨ Amoxicillin, atazanavir, cilostazol, citalopram, clarithromycin, clopidogrel, cyclosporine, dasatinib, digoxin, disulfiram, erlotinib, itraconazole, ketoconazole, methotrexate, mycophenolate mofetil, nelfinavir, nilotinib, phenytoin, rifampin, saquinavir, Marychuy's wort, tacrolimus, voriconazole ¨ Benzodiazepine medicine (including diazepam) ¨ Blood thinner (including warfarin) ¨ Diuretic (water pill) ¨ Iron supplements Warnings While Using This Medicine: · Tell your doctor if you are pregnant or breastfeeding, or if you have liver disease, lupus, or osteoporosis. · This medicine may cause the following problems: ¨ Kidney problems ¨ Low vitamin B12 or magnesium levels in the blood ¨ Increased risk of broken bones in the hip, wrist, or spine · This medicine can cause diarrhea. Call your doctor if the diarrhea becomes severe, does not stop, or is bloody. Do not take any medicine to stop diarrhea until you have talked to your doctor. Diarrhea can occur 2 months or more after you stop taking this medicine. · Tell any doctor or dentist who treats you that you are using this medicine. This medicine may affect certain medical test results. · Your doctor will check your progress and the effects of this medicine at regular visits. Keep all appointments. · Keep all medicine out of the reach of children. Never share your medicine with anyone. Possible Side Effects While Using This Medicine:  
Call your doctor right away if you notice any of these side effects: · Allergic reaction: Itching or hives, swelling in your face or hands, swelling or tingling in your mouth or throat, chest tightness, trouble breathing · Blistering, peeling, red skin rash · Fever, joint pain, swelling in the body, unusual weight gain, change in how much or how often you urinate · Joint pain, rash on your cheeks or arms that gets worse in the sun · Seizures, dizziness, uneven heartbeat, muscle cramps or twitching · Severe diarrhea, stomach cramps, fever · Stomach pain, nausea, vomiting, weight loss If you notice these less serious side effects, talk with your doctor:  
· Headache · Mild diarrhea or stomach pain If you notice other side effects that you think are caused by this medicine, tell your doctor. Call your doctor for medical advice about side effects. You may report side effects to FDA at 1-641-EBL-8767 © 2017 [facility] Information is for End User's use only and may not be sold, redistributed or otherwise used for commercial purposes. The above information is an  only. It is not intended as medical advice for individual conditions or treatments. Talk to your doctor, nurse or pharmacist before following any medical regimen to see if it is safe and effective for you. Introducing [facility] SERVICES! [employer] introduces [employer] patient portal. Now you can access parts of your medical record, email your doctor's office, and request medication refills online. 1. In your internet browser, go to [email or web address] 2. Click on the First Time User? Click Here link in the Sign In box. You will see the New Member Sign Up page. 3. Enter your [employer] Access Code exactly as it appears below. You will not need to use this code after youve completed the sign-up process. If you do not sign up before the expiration date, you must request a new code. · [employer] Access Code: [email or web address] Expires: 10/27/2018  5:19 AM 
 
4. Enter the last four digits of your Social Security Number (xxxx) and Date of Birth (mm/dd/yyyy) as indicated and click Submit. You will be taken to the next sign-up page. 5. Create a [employer] ID. This will be your [employer] login ID and cannot be changed, so think of one that is secure and easy to remember. 6. Create a [employer] password. You can change your password at any time. 7. Enter your Password Reset Question and Answer. This can be used at a later time if you forget your password. 8. Enter your e-mail address. You will receive e-mail notification when new information is available in [address] 9. Click Sign Up. You can now view and download portions of your medical record. 10. Click the Download Summary menu link to download a portable copy of your medical information. If you have questions, please visit the Frequently Asked Questions section of the [employer] website. Remember, [employer] is NOT to be used for urgent needs. For medical emergencies, dial 911. Now available from your iPhone and Android! Please provide this summary of care documentation to your next provider. Your primary care clinician is listed as [address]. If you have any questions after today's visit, please call [phone].

## 2018-10-09 NOTE — PROGRESS NOTES
Chief Complaint   Patient presents with    Abdominal Pain     Nausea, multiple episodes of vomitting and diarrhea, and heart burn since thursday 10/4      1. Have you been to the ER, urgent care clinic since your last visit? Hospitalized since your last visit? No     2. Have you seen or consulted any other health care providers outside of the [address] [place] [address] since your last visit? Include any pap smears or colon screening.   Follow with Cardiology

## 2018-10-09 NOTE — PATIENT INSTRUCTIONS
Gastroesophageal Reflux Disease (GERD): Care Instructions  Your Care Instructions    Gastroesophageal reflux disease (GERD) is the backward flow of stomach acid into the esophagus. The esophagus is the tube that leads from your throat to your stomach. A one-way valve prevents the stomach acid from moving up into this tube. When you have GERD, this valve does not close tightly enough. If you have mild GERD symptoms including heartburn, you may be able to control the problem with antacids or over-the-counter medicine. Changing your diet, losing weight, and making other lifestyle changes can also help reduce symptoms. Follow-up care is a key part of your treatment and safety. Be sure to make and go to all appointments, and call your doctor if you are having problems. It's also a good idea to know your test results and keep a list of the medicines you take. How can you care for yourself at home? · Take your medicines exactly as prescribed. Call your doctor if you think you are having a problem with your medicine. · Your doctor may recommend over-the-counter medicine. For mild or occasional indigestion, antacids, such as Tums, Gaviscon, Mylanta, or Maalox, may help. Your doctor also may recommend over-the-counter acid reducers, such as Pepcid AC, Tagamet HB, Zantac 75, or Prilosec. Read and follow all instructions on the label. If you use these medicines often, talk with your doctor. · Change your eating habits. ¨ It's best to eat several small meals instead of two or three large meals. ¨ After you eat, wait 2 to 3 hours before you lie down. ¨ Chocolate, mint, and alcohol can make GERD worse. ¨ Spicy foods, foods that have a lot of acid (like tomatoes and oranges), and coffee can make GERD symptoms worse in some people. If your symptoms are worse after you eat a certain food, you may want to stop eating that food to see if your symptoms get better.   · Do not smoke or chew tobacco. Smoking can make GERD worse. If you need help quitting, talk to your doctor about stop-smoking programs and medicines. These can increase your chances of quitting for good. · If you have GERD symptoms at night, raise the head of your bed 6 to 8 inches by putting the frame on blocks or placing a foam wedge under the head of your mattress. (Adding extra pillows does not work.)  · Do not wear tight clothing around your middle. · Lose weight if you need to. Losing just 5 to 10 pounds can help. When should you call for help? Call your doctor now or seek immediate medical care if:    · You have new or different belly pain.     · Your stools are black and tarlike or have streaks of blood.    Watch closely for changes in your health, and be sure to contact your doctor if:    · Your symptoms have not improved after 2 days.     · Food seems to catch in your throat or chest.   Where can you learn more? Go to http://emiliano-madeleine.info/. Enter V261 in the search box to learn more about \"Gastroesophageal Reflux Disease (GERD): Care Instructions. \"  Current as of: March 28, 2018  Content Version: 11.8  © 9418-8636 [employer]. Care instructions adapted under license by [employer] (which disclaims liability or warranty for this information). If you have questions about a medical condition or this instruction, always ask your healthcare professional. [address] any warranty or liability for your use of this information. Omeprazole (By mouth)   Omeprazole ([id number]-[id number])  Treats heartburn, a damaged esophagus, stomach ulcers, and gastroesophageal reflux disease (GERD). This medicine is a proton pump inhibitor (PPI).    Brand Name(s): First-Omeprazole, [facility] Omeprazole, Good Sense Omeprazole, Leader Omeprazole, Omeclamox-Willie, PrevidolRx Analgesic Willie, PriLOSEC, PriLOSEC OTC, Quality Choice Omeprazole Magnesium, [facility] Omeprazole, Sunmark Omeprazole, TopCare Omeprazole   There may be other brand names for this medicine. When This Medicine Should Not Be Used: This medicine is not right for everyone. Do not use it if you had an allergic reaction to omeprazole or similar medicines. How to Use This Medicine:   Delayed Release Capsule, Packet, Powder for Suspension, Delayed Release Tablet  · Your doctor will tell you how much medicine to use. Do not use more than directed. · This medicine should come with a Medication Guide. Ask your pharmacist for a copy if you do not have one. · Follow the instructions on the medicine label if you are using this medicine without a prescription. If you are using the over-the-counter medicine, do not take it for more than 14 days or use the treatment more often than every 4 months unless your doctor tells you to. · Take this medicine at least 1 hour before a meal and for the full time of treatment, even if you begin to feel better after a few days. · Capsule or tablet:   ¨ Swallow whole. Do not crush or chew it. ¨ If you cannot swallow the capsule, you may open it and pour the medicine into a small amount of applesauce. Stir this mixture well and swallow it without chewing. To help make sure you get the full dose, drink a full glass of water. · Oral packet:   ¨ Mix the contents of a 2.5-milligram (mg) packet with 5 milliliters (mL) of water or mix the contents of a 10-mg packet with 15 mL of water. Do not use other liquids or food. ¨ Stir well. Let the mixture thicken for 2 to 3 minutes. ¨ Stir again and drink the medicine within 30 minutes. ¨ If there is any medicine left, add more water, stir, and drink immediately. § To prepare the oral packet for a feeding tube:   § Add 5 mL of water to a catheter-tipped syringe. Then add the contents of a 2.5-mg packet (or use 15 mL of water for the 10-mg packet). § Shake the syringe right away. Let the mixture thicken for 2 to 3 minutes. § Shake the syringe once more.  Give the medicine through the tube within 30 minutes. § Refill the syringe with an equal amount of water and shake it. Use the water to flush the tube to make sure all the medicine is given. · Missed dose: Take a dose as soon as you remember. If it is almost time for your next dose, wait until then and take a regular dose. Do not take extra medicine to make up for a missed dose. · Store the medicine in a closed container at room temperature, away from heat, moisture, and direct light. Drugs and Foods to Avoid:   Ask your doctor or pharmacist before using any other medicine, including over-the-counter medicines, vitamins, and herbal products. · Do not use omeprazole if you are also using medicines that contain rilpivirine. · Some foods and medicines can affect how omeprazole works. Tell your doctor if you are using any of the following:  ¨ Amoxicillin, atazanavir, cilostazol, citalopram, clarithromycin, clopidogrel, cyclosporine, dasatinib, digoxin, disulfiram, erlotinib, itraconazole, ketoconazole, methotrexate, mycophenolate mofetil, nelfinavir, nilotinib, phenytoin, rifampin, saquinavir, Marychuy's wort, tacrolimus, voriconazole  ¨ Benzodiazepine medicine (including diazepam)  ¨ Blood thinner (including warfarin)  ¨ Diuretic (water pill)  ¨ Iron supplements  Warnings While Using This Medicine:   · Tell your doctor if you are pregnant or breastfeeding, or if you have liver disease, lupus, or osteoporosis. · This medicine may cause the following problems:   ¨ Kidney problems  ¨ Low vitamin B12 or magnesium levels in the blood  ¨ Increased risk of broken bones in the hip, wrist, or spine  · This medicine can cause diarrhea. Call your doctor if the diarrhea becomes severe, does not stop, or is bloody. Do not take any medicine to stop diarrhea until you have talked to your doctor. Diarrhea can occur 2 months or more after you stop taking this medicine. · Tell any doctor or dentist who treats you that you are using this medicine. This medicine may affect certain medical test results. · Your doctor will check your progress and the effects of this medicine at regular visits. Keep all appointments. · Keep all medicine out of the reach of children. Never share your medicine with anyone. Possible Side Effects While Using This Medicine:   Call your doctor right away if you notice any of these side effects:  · Allergic reaction: Itching or hives, swelling in your face or hands, swelling or tingling in your mouth or throat, chest tightness, trouble breathing  · Blistering, peeling, red skin rash  · Fever, joint pain, swelling in the body, unusual weight gain, change in how much or how often you urinate  · Joint pain, rash on your cheeks or arms that gets worse in the sun  · Seizures, dizziness, uneven heartbeat, muscle cramps or twitching  · Severe diarrhea, stomach cramps, fever  · Stomach pain, nausea, vomiting, weight loss  If you notice these less serious side effects, talk with your doctor:   · Headache  · Mild diarrhea or stomach pain  If you notice other side effects that you think are caused by this medicine, tell your doctor. Call your doctor for medical advice about side effects. You may report side effects to FDA at 3-963-FDA-9627  © 2017 [address] [employer]  Information is for End User's use only and may not be sold, redistributed or otherwise used for commercial purposes. The above information is an  only. It is not intended as medical advice for individual conditions or treatments. Talk to your doctor, nurse or pharmacist before following any medical regimen to see if it is safe and effective for you.

## 2018-10-16 ENCOUNTER — OFFICE VISIT (OUTPATIENT)
Dept: FAMILY MEDICINE CLINIC | Age: 48
End: 2018-10-16

## 2018-10-16 VITALS
HEIGHT: 66 IN | WEIGHT: 231.4 LBS | HEART RATE: 88 BPM | SYSTOLIC BLOOD PRESSURE: 130 MMHG | BODY MASS INDEX: 37.19 KG/M2 | OXYGEN SATURATION: 99 % | RESPIRATION RATE: 22 BRPM | TEMPERATURE: 97.9 F | DIASTOLIC BLOOD PRESSURE: 72 MMHG

## 2018-10-16 DIAGNOSIS — I27.20 PULMONARY HTN (HCC): ICD-10-CM

## 2018-10-16 DIAGNOSIS — K21.9 GASTROESOPHAGEAL REFLUX DISEASE, ESOPHAGITIS PRESENCE NOT SPECIFIED: ICD-10-CM

## 2018-10-16 DIAGNOSIS — R63.5 WEIGHT GAIN: ICD-10-CM

## 2018-10-16 DIAGNOSIS — K44.9 HIATAL HERNIA: Primary | ICD-10-CM

## 2018-10-16 DIAGNOSIS — I50.9 CONGESTIVE HEART FAILURE, UNSPECIFIED HF CHRONICITY, UNSPECIFIED HEART FAILURE TYPE (HCC): ICD-10-CM

## 2018-10-16 DIAGNOSIS — Z91.119 DIETARY NONCOMPLIANCE: ICD-10-CM

## 2018-10-16 PROBLEM — E66.01 SEVERE OBESITY (HCC): Status: ACTIVE | Noted: 2018-10-16

## 2018-10-16 RX ORDER — FAMOTIDINE 20 MG/1
20 TABLET, FILM COATED ORAL
COMMUNITY
Start: 2018-10-10 | End: 2019-03-11

## 2018-10-16 NOTE — PROGRESS NOTES
Chief Complaint Patient presents with  Abdominal Pain  
  c/o stomach pressure since last thursday 10/11/18. presented to [facility] on 10/13/18 after episodes of nausea, vomitting and dirrhea. Patient has pending GI specialist appiontment 1. Have you been to the ER, urgent care clinic since your last visit? Hospitalized since your last visit? [facility] ED 10/13/18 2. Have you seen or consulted any other health care providers outside of the [facility] since your last visit? Include any pap smears or colon screening.   No

## 2018-10-16 NOTE — PATIENT INSTRUCTIONS

## 2018-10-19 ENCOUNTER — OFFICE VISIT (OUTPATIENT)
Dept: FAMILY MEDICINE CLINIC | Age: 48
End: 2018-10-19

## 2018-10-19 VITALS
OXYGEN SATURATION: 98 % | SYSTOLIC BLOOD PRESSURE: 136 MMHG | HEART RATE: 98 BPM | TEMPERATURE: 97.8 F | BODY MASS INDEX: 36.74 KG/M2 | HEIGHT: 66 IN | RESPIRATION RATE: 21 BRPM | DIASTOLIC BLOOD PRESSURE: 82 MMHG | WEIGHT: 228.6 LBS

## 2018-10-19 DIAGNOSIS — R10.9 ABDOMINAL PAIN, UNSPECIFIED ABDOMINAL LOCATION: ICD-10-CM

## 2018-10-19 DIAGNOSIS — I50.9 CONGESTIVE HEART FAILURE, UNSPECIFIED HF CHRONICITY, UNSPECIFIED HEART FAILURE TYPE (HCC): Primary | ICD-10-CM

## 2018-10-19 DIAGNOSIS — R63.5 WEIGHT GAIN: ICD-10-CM

## 2018-10-19 RX ORDER — BUMETANIDE 1 MG/1
1 TABLET ORAL 2 TIMES DAILY
Qty: 60 TAB | Refills: 0 | Status: SHIPPED | OUTPATIENT
Start: 2018-10-19 | End: 2019-01-18 | Stop reason: SDUPTHER

## 2018-10-19 NOTE — PROGRESS NOTES
Chief Complaint   Patient presents with    Other     Follow Up      1. Have you been to the ER, urgent care clinic since your last visit? Hospitalized since your last visit? No     2. Have you seen or consulted any other health care providers outside of the [address] [place] [address] since your last visit? Include any pap smears or colon screening. No       Patient declined flu immunization  this visit.

## 2018-10-19 NOTE — PROGRESS NOTES
Subjective: Yao Juarez is a 51 y.o. male with CHF here for follow up because of weight gain of 18 pounds associated with eating higher sodium loads. Advised him to increase bumex 1 mg to tid dosing. Which he has done and has appreciated a 3 pound weight loss. Lab Results   Component Value Date/Time    Potassium 4.1 10/09/2018 09:19 AM       WT / BMI 10/16/2018 10/9/2018   WEIGHT 231 lb 6.4 oz 216 lb 12.8 oz   BODY MASS INDEX 37.35 kg/m2 34.99 kg/m2   Review of Systems   Gastrointestinal: Positive for abdominal pain. Negative for heartburn, nausea and vomiting. Pressure like pain abdomen. Intermittently very concerned about vascular sources for abdominal pain. Has appointment with GI November 5,2018  Reviewed CT from ED two weeks ago. Has diverticula    patient is concerned this originated from potassium supplementation. He is also complaining of ED previous diagnosis and has Viagra for prn use    Current Outpatient Medications   Medication Sig Dispense Refill    famotidine (PEPCID) 20 mg tablet Take 20 mg by mouth two (2) times daily as needed.  sacubitril-valsartan (ENTRESTO) 24 mg/26 mg tablet Take 1 Tab by mouth two (2) times a day. 60 Tab 3    carvedilol (COREG) 6.25 mg tablet Take  by mouth two (2) times daily (with meals).  omeprazole (PRILOSEC) 20 mg capsule Take 1 Cap by mouth daily. 30 Cap 3    sildenafil, antihypertensive, (REVATIO) 20 mg tablet Take 20 mg by mouth three (3) times daily.  bumetanide (BUMEX) 1 mg tablet Take 1 Tab by mouth daily. (Patient taking differently: Take 1 mg by mouth two (2) times a day.) 30 Tab 6    potassium chloride SR (K-TAB) 20 mEq tablet 20 mEq. PMH: reviewed medications and allergy lists and medical and family history. OBJECTIVE:  Awake and alert in no acute distress  Neck supple without lymphadenopathy, no carotid artery bruits auscultated bilaterally.   No thyromegaly  Lungs clear throughout  S1 S2 RRR without ectopy or murmur auscultated. Abdomen: normoactive bowel sounds all quadrants, no tenderness to abdomen upper and generalized tenderness to palpation over lower quadrants. No palpable masses no rebound tenderness. No hepatosplenomegaly    Extremities: no clubbing, cyanosis, peripheral edema    Visit Vitals  /82 (BP 1 Location: Left arm, BP Patient Position: Sitting)   Pulse 98   Temp 97.8 °F (36.6 °C) (Oral)   Resp 21   Ht 5' 6\" (1.676 m)   Wt 228 lb 9.6 oz (103.7 kg)   SpO2 98%   BMI 36.90 kg/m²     Diagnoses and all orders for this visit:    Congestive heart failure, unspecified HF chronicity, unspecified heart failure type ([facility] [address]  -     POTASSIUM; Future  Continue with bumex 1 mg tid repeat K+ level next Wednesday then will notify of K+ also would like for patient to have his weight rechecked. Weight gain  -     POTASSIUM; Future    Abdominal pain, unspecified abdominal location  -     US ABD COMP; Future  Anticipatory guidance given  Reviewed low sodium diet  with patient. I have discussed the diagnosis with the patient and the intended plan as seen in the above orders. The patient has received an after-visit summary and questions were answered concerning future plans. I have discussed medication side effects and warnings with the patient as well. Follow-up Disposition:  Return in about 5 weeks (around 11/23/2018) for follow up with DR. Frances Willis PCP for abdominal pain and GI follow up .

## 2018-10-25 ENCOUNTER — HOSPITAL ENCOUNTER (OUTPATIENT)
Dept: ULTRASOUND IMAGING | Age: 48
Discharge: HOME | End: 2018-10-25
Attending: NURSE PRACTITIONER
Payer: MEDICARE

## 2018-10-25 DIAGNOSIS — R10.9 ABDOMINAL PAIN, UNSPECIFIED ABDOMINAL LOCATION: ICD-10-CM

## 2018-10-25 PROCEDURE — 76700 US EXAM ABDOM COMPLETE: CPT

## 2018-10-31 ENCOUNTER — TELEPHONE (OUTPATIENT)
Dept: FAMILY MEDICINE CLINIC | Age: 48
End: 2018-10-31

## 2018-10-31 NOTE — TELEPHONE ENCOUNTER
Pt is calling to find out results for his ultrasound  of the abdomen  Please advise  [phone]    I made pt aware that there is a time frame of 24 to 48 hours to hear a response.

## 2018-11-01 NOTE — TELEPHONE ENCOUNTER
Called and left message with patient regarding gallbladder results.  Patient was instructed to call back.  -Severa Finders, LPN

## 2018-11-02 NOTE — TELEPHONE ENCOUNTER
Called patient and let him know his lab results were normal and patient stated he still had pain, rescheduled patients follow up appointment to 11/05/18.  -Odette Trujillo LPN

## 2018-11-06 NOTE — TELEPHONE ENCOUNTER
Called and left message with patient regarding pending appointment today, patient need to see his PCP for follow up.  Instructed patient to call back and reschedule.    -Odette Trujillo

## 2018-11-19 PROCEDURE — 99283 EMERGENCY DEPT VISIT LOW MDM: CPT

## 2018-11-20 ENCOUNTER — HOSPITAL ENCOUNTER (EMERGENCY)
Age: 48
Discharge: HOME | End: 2018-11-20
Attending: EMERGENCY MEDICINE
Payer: MEDICARE

## 2018-11-20 VITALS
OXYGEN SATURATION: 96 % | HEIGHT: 66 IN | HEART RATE: 89 BPM | RESPIRATION RATE: 16 BRPM | SYSTOLIC BLOOD PRESSURE: 128 MMHG | BODY MASS INDEX: 32.95 KG/M2 | DIASTOLIC BLOOD PRESSURE: 85 MMHG | WEIGHT: 205 LBS | TEMPERATURE: 98.5 F

## 2018-11-20 DIAGNOSIS — B34.9 VIRAL SYNDROME: Primary | ICD-10-CM

## 2018-11-20 DIAGNOSIS — J02.9 ACUTE PHARYNGITIS, UNSPECIFIED ETIOLOGY: ICD-10-CM

## 2018-11-20 PROCEDURE — 74011250637 HC RX REV CODE- 250/637: Performed by: EMERGENCY MEDICINE

## 2018-11-20 RX ORDER — AMOXICILLIN 250 MG/1
500 CAPSULE ORAL EVERY 8 HOURS
Status: DISCONTINUED | OUTPATIENT
Start: 2018-11-20 | End: 2018-11-20 | Stop reason: HOSPADM

## 2018-11-20 RX ORDER — IBUPROFEN 600 MG/1
600 TABLET ORAL
Status: COMPLETED | OUTPATIENT
Start: 2018-11-20 | End: 2018-11-20

## 2018-11-20 RX ORDER — IBUPROFEN 600 MG/1
600 TABLET ORAL
Qty: 30 TAB | Refills: 0 | Status: SHIPPED | OUTPATIENT
Start: 2018-11-20 | End: 2019-03-11

## 2018-11-20 RX ORDER — AMOXICILLIN 500 MG/1
500 TABLET, FILM COATED ORAL 3 TIMES DAILY
Qty: 30 TAB | Refills: 0 | Status: SHIPPED | OUTPATIENT
Start: 2018-11-20 | End: 2019-01-18 | Stop reason: ALTCHOICE

## 2018-11-20 RX ADMIN — IBUPROFEN 600 MG: 600 TABLET ORAL at 00:20

## 2018-11-20 RX ADMIN — AMOXICILLIN 500 MG: 250 CAPSULE ORAL at 00:20

## 2018-11-20 NOTE — ED NOTES
I have reviewed discharge instructions with the patient. The patient verbalized understanding. Patient armband removed and given to patient to take home. Patient was informed of the privacy risks if armband lost or stolen Current Discharge Medication List  
  
START taking these medications Details  
ibuprofen (MOTRIN) 600 mg tablet Take 1 Tab by mouth every six (6) hours as needed for Pain. Qty: 30 Tab, Refills: 0  
  
amoxicillin 500 mg tab Take 500 mg by mouth three (3) times daily. Qty: 30 Tab, Refills: 0

## 2018-11-20 NOTE — ED PROVIDER NOTES
EMERGENCY DEPARTMENT HISTORY AND PHYSICAL EXAM 
 
12:10 AM 
 
 
Date: 11/20/2018 Patient Name: Karo Neri History of Presenting Illness Chief Complaint Patient presents with  Cough  Sore Throat  Chills History Provided By: Patient Chief Complaint: Sore throat Duration:  3 days Timing:  constant Location: diffuse throat Quality: NA Severity: moderate Modifying Factors: No modifying or aggravating factors were reported. Associated Symptoms: chills, myalgias, congestion, and diaphoresis Additional History (Context): 12:10 AM Karo Neri is a 51 y.o. male with h/o CHF, HTN, and  PTSD who presents to ED complaining of constant moderate diffuse throat pain onset for 3 days. No modifying or aggravating factors were reported. Associated Sx include chills, myalgias, congestion, and diaphoresis. Denies any further complaints or symptoms at the moment. PCP: Angy Topete MD 
 
Current Facility-Administered Medications Medication Dose Route Frequency Provider Last Rate Last Dose  amoxicillin (AMOXIL) capsule 500 mg  500 mg Oral Q8H Annette Ochoa MD      
 ibuprofen (MOTRIN) tablet 600 mg  600 mg Oral NOW Annette Ochoa MD      
 
Current Outpatient Medications Medication Sig Dispense Refill  ibuprofen (MOTRIN) 600 mg tablet Take 1 Tab by mouth every six (6) hours as needed for Pain. 30 Tab 0  
 amoxicillin 500 mg tab Take 500 mg by mouth three (3) times daily. 30 Tab 0  
 bumetanide (BUMEX) 1 mg tablet Take 1 Tab by mouth two (2) times a day. 60 Tab 0  
 famotidine (PEPCID) 20 mg tablet Take 20 mg by mouth two (2) times daily as needed.  sacubitril-valsartan (ENTRESTO) 24 mg/26 mg tablet Take 1 Tab by mouth two (2) times a day. 60 Tab 3  carvedilol (COREG) 6.25 mg tablet Take  by mouth two (2) times daily (with meals).  omeprazole (PRILOSEC) 20 mg capsule Take 1 Cap by mouth daily.  30 Cap 3  
  sildenafil, antihypertensive, (REVATIO) 20 mg tablet Take 20 mg by mouth three (3) times daily.  potassium chloride SR (K-TAB) 20 mEq tablet 20 mEq. Past History Past Medical History: 
Past Medical History:  
Diagnosis Date  CHF (congestive heart failure) ([facility] [address] 12/2013 [facility]  Hypertension  [facility] disorder) [facility]  
 Pulmonary HTN ([facility] [address]  Sleep apnea Past Surgical History: No past surgical history on file. Family History: No family history on file. Social History: 
Social History Tobacco Use  Smoking status: Never Smoker  Smokeless tobacco: Never Used Substance Use Topics  Alcohol use: Yes Comment: Social drinker  Drug use: No  
 
 
Allergies: 
No Known Allergies Review of Systems Review of Systems Constitutional: Positive for chills and diaphoresis. HENT: Positive for congestion and sore throat. Eyes: Negative. Respiratory: Negative. Cardiovascular: Negative. Gastrointestinal: Negative. Endocrine: Negative. Genitourinary: Negative. Musculoskeletal: Positive for myalgias. Allergic/Immunologic: Negative. Neurological: Negative. Hematological: Negative. Psychiatric/Behavioral: Negative. All other systems reviewed and are negative. Physical Exam  
 
Visit Vitals /85 (BP 1 Location: Left arm, BP Patient Position: Sitting) Pulse 89 Temp 98.5 °F (36.9 °C) Resp 16 Ht 5' 6\" (1.676 m) Wt 93 kg (205 lb) SpO2 96% BMI 33.09 kg/m² Physical Exam  
Constitutional: He is oriented to person, place, and time. He appears well-developed and well-nourished. No distress. HENT:  
Head: Normocephalic. Mouth/Throat: Oropharynx is clear and moist.  
Erythema but no exudate Eyes: Conjunctivae and EOM are normal. Pupils are equal, round, and reactive to light. Neck: Normal range of motion. Neck supple. Cardiovascular: Normal rate, regular rhythm, normal heart sounds and intact distal pulses. No murmur heard. Pulmonary/Chest: Effort normal and breath sounds normal. No respiratory distress. He has no wheezes. He has no rales. He exhibits no tenderness. Abdominal: Soft. Bowel sounds are normal. He exhibits no distension. There is no tenderness. There is no rebound. Musculoskeletal: Normal range of motion. He exhibits no edema or tenderness. Neurological: He is alert and oriented to person, place, and time. No cranial nerve deficit. He exhibits normal muscle tone. Coordination normal.  
Skin: Skin is warm and dry. No rash noted. Psychiatric: He has a normal mood and affect. His behavior is normal. Judgment and thought content normal.  
Nursing note and vitals reviewed. Diagnostic Study Results Medical Decision Making I am the first provider for this patient. I reviewed the vital signs, available nursing notes, past medical history, past surgical history, family history and social history. Vital Signs-Reviewed the patient's vital signs. Pulse Oximetry Analysis -  Patient is 96% O2 on RA, indicating adequate oxygenation. Cardiac Monitor: 
Rate: 89 bpm 
 
Records Reviewed: Old medical records and nursing notes (Time of Review: 12:10 AM) ED Course: Progress Notes, Reevaluation, and Consults:    Patient remained stable. Provider Notes (Medical Decision Making): tonsillitis vs pharyngitis vs flu Sx vs URI Diagnosis Clinical Impression: 1. Viral syndrome 2. Acute pharyngitis, unspecified etiology Disposition: DC Follow-up Information Follow up With Specialties Details Why Contact Info Alex Jha MD Family Practice Schedule an appointment as soon as possible for a visit in 2 days For follow up [address] [place] [address] [phone] [facility] [address] 
[phone] [address] [facility] EMERGENCY DEPT Emergency Medicine Go to As needed, If symptoms worsen [address] [facility] [address] [facility] [address] 
[phone] Medication List  
  
START taking these medications   
amoxicillin 500 mg Tab Take 500 mg by mouth three (3) times daily. ibuprofen 600 mg tablet Commonly known as:  MOTRIN Take 1 Tab by mouth every six (6) hours as needed for Pain. ASK your doctor about these medications   
bumetanide 1 mg tablet Commonly known as:  Leta Dolores Take 1 Tab by mouth two (2) times a day. carvedilol 6.25 mg tablet Commonly known as:  COREG 
  
famotidine 20 mg tablet Commonly known as:  PEPCID 
  
omeprazole 20 mg capsule Commonly known as:  PRILOSEC Take 1 Cap by mouth daily. potassium chloride SR 20 mEq tablet Commonly known as:  K-TAB REVATIO 20 mg tablet Generic drug:  sildenafil (antihypertensive) 
  
sacubitril-valsartan 24-26 mg tablet Commonly known as:  ENTRESTO Take 1 Tab by mouth two (2) times a day. Where to Get Your Medications Information about where to get these medications is not yet available Ask your nurse or doctor about these medications · amoxicillin 500 mg Tab · ibuprofen 600 mg tablet 
  
 
_______________________________ Attestations: 
Scribe Attestation Moustapha Barrios acting as a scribe for and in the presence of Venkat Waller MD     
November 20, 2018 at 12:10 AM 
    
Provider Attestation:     
I personally performed the services described in the documentation, reviewed the documentation, as recorded by the scribe in my presence, and it accurately and completely records my words and actions. November 20, 2018 at 12:10 AM - Venkat Waller MD   
_______________________________

## 2018-11-20 NOTE — DISCHARGE INSTRUCTIONS
Viral Infections: Care Instructions  Your Care Instructions    You don't feel well, but it's not clear what's causing it. You may have a viral infection. Viruses cause many illnesses, such as the common cold, influenza, fever, rashes, and the diarrhea, nausea, and vomiting that are often called \"stomach flu. \" You may wonder if antibiotic medicines could make you feel better. But antibiotics only treat infections caused by bacteria. They don't work on viruses. The good news is that viral infections usually aren't serious. Most will go away in a few days without medical treatment. In the meantime, there are a few things you can do to make yourself more comfortable. Follow-up care is a key part of your treatment and safety. Be sure to make and go to all appointments, and call your doctor if you are having problems. It's also a good idea to know your test results and keep a list of the medicines you take. How can you care for yourself at home? · Get plenty of rest if you feel tired. · Take an over-the-counter pain medicine if needed, such as acetaminophen (Tylenol), ibuprofen (Advil, Motrin), or naproxen (Aleve). Read and follow all instructions on the label. · Be careful when taking over-the-counter cold or flu medicines and Tylenol at the same time. Many of these medicines have acetaminophen, which is Tylenol. Read the labels to make sure that you are not taking more than the recommended dose. Too much acetaminophen (Tylenol) can be harmful. · Drink plenty of fluids, enough so that your urine is light yellow or clear like water. If you have kidney, heart, or liver disease and have to limit fluids, talk with your doctor before you increase the amount of fluids you drink. · Stay home from work, school, and other public places while you have a fever. When should you call for help? Call 911 anytime you think you may need emergency care.  For example, call if:    · You have severe trouble breathing.     · You passed out (lost consciousness).    Call your doctor now or seek immediate medical care if:    · You seem to be getting much sicker.     · You have a new or higher fever.     · You have blood in your stools.     · You have new belly pain, or your pain gets worse.     · You have a new rash.    Watch closely for changes in your health, and be sure to contact your doctor if:    · You start to get better and then get worse.     · You do not get better as expected. Where can you learn more? Go to http://emiliano-madeleine.info/. Enter A619 in the search box to learn more about \"Viral Infections: Care Instructions. \"  Current as of: November 18, 2017  Content Version: 11.8  © 3429-5514 [employer]. Care instructions adapted under license by [employer] (which disclaims liability or warranty for this information). If you have questions about a medical condition or this instruction, always ask your healthcare professional. [place] [address] any warranty or liability for your use of this information. Sore Throat: Care Instructions  Your Care Instructions    Infection by bacteria or a virus causes most sore throats. Cigarette smoke, dry air, air pollution, allergies, and yelling can also cause a sore throat. Sore throats can be painful and annoying. Fortunately, most sore throats go away on their own. If you have a bacterial infection, your doctor may prescribe antibiotics. Follow-up care is a key part of your treatment and safety. Be sure to make and go to all appointments, and call your doctor if you are having problems. It's also a good idea to know your test results and keep a list of the medicines you take. How can you care for yourself at home? · If your doctor prescribed antibiotics, take them as directed. Do not stop taking them just because you feel better. You need to take the full course of antibiotics.   · Gargle with warm salt water once an hour to help reduce swelling and relieve discomfort. Use 1 teaspoon of salt mixed in 1 cup of warm water. · Take an over-the-counter pain medicine, such as acetaminophen (Tylenol), ibuprofen (Advil, Motrin), or naproxen (Aleve). Read and follow all instructions on the label. · Be careful when taking over-the-counter cold or flu medicines and Tylenol at the same time. Many of these medicines have acetaminophen, which is Tylenol. Read the labels to make sure that you are not taking more than the recommended dose. Too much acetaminophen (Tylenol) can be harmful. · Drink plenty of fluids. Fluids may help soothe an irritated throat. Hot fluids, such as tea or soup, may help decrease throat pain. · Use over-the-counter throat lozenges to soothe pain. Regular cough drops or hard candy may also help. These should not be given to young children because of the risk of choking. · Do not smoke or allow others to smoke around you. If you need help quitting, talk to your doctor about stop-smoking programs and medicines. These can increase your chances of quitting for good. · Use a vaporizer or humidifier to add moisture to your bedroom. Follow the directions for cleaning the machine. When should you call for help? Call your doctor now or seek immediate medical care if:    · You have new or worse trouble swallowing.     · Your sore throat gets much worse on one side.    Watch closely for changes in your health, and be sure to contact your doctor if you do not get better as expected. Where can you learn more? Go to http://emiliano-madeleine.info/. Enter 062 441 80 19 in the search box to learn more about \"Sore Throat: Care Instructions. \"  Current as of: March 28, 2018  Content Version: 11.8  © 0221-6035 [employer]. Care instructions adapted under license by [employer] (which disclaims liability or warranty for this information).  If you have questions about a medical condition or this instruction, always ask your healthcare professional. [place] [address] any warranty or liability for your use of this information.

## 2018-11-26 ENCOUNTER — HOSPITAL ENCOUNTER (EMERGENCY)
Age: 48
Discharge: HOME | End: 2018-11-26
Attending: EMERGENCY MEDICINE
Payer: MEDICARE

## 2018-11-26 VITALS
OXYGEN SATURATION: 96 % | TEMPERATURE: 97.4 F | RESPIRATION RATE: 16 BRPM | HEART RATE: 92 BPM | SYSTOLIC BLOOD PRESSURE: 125 MMHG | DIASTOLIC BLOOD PRESSURE: 85 MMHG | WEIGHT: 210 LBS | BODY MASS INDEX: 33.75 KG/M2 | HEIGHT: 66 IN

## 2018-11-26 DIAGNOSIS — R11.15 NON-INTRACTABLE CYCLICAL VOMITING WITH NAUSEA: Primary | ICD-10-CM

## 2018-11-26 PROCEDURE — 99282 EMERGENCY DEPT VISIT SF MDM: CPT

## 2018-11-26 RX ORDER — ONDANSETRON 8 MG/1
8 TABLET, ORALLY DISINTEGRATING ORAL
Qty: 10 TAB | Refills: 0 | Status: SHIPPED | OUTPATIENT
Start: 2018-11-26 | End: 2019-03-11

## 2018-11-26 NOTE — ED TRIAGE NOTES
Pt states that he was seen here 8 days ago and dx with \"stomach flu\" however pt reports that symptoms have not resolved. C/O abdominal pain, N/V, cough and body aches.

## 2018-11-26 NOTE — ED PROVIDER NOTES
EMERGENCY DEPARTMENT HISTORY AND PHYSICAL EXAM 
 
10:20 AM 
 
 
Date: 11/26/2018 Patient Name: Chika Ochoa History of Presenting Illness Chief Complaint Patient presents with  Abdominal Pain  Vomiting  Cough  Generalized Body Aches History Provided By: Patient Chief Complaint: Vomiting Duration:  8 days Timing:  Constant Location: Generalized Quality: NA Severity: moderate Modifying Factors: Improved by Amoxicillin Associated Symptoms: Abd pain, nausea, diarrhea, cough, and myalgias. Denies fever. Additional History (Context): 10:20 AM Chika Ochoa is a 51 y.o. male with h/o CHF, HTN, PTSD, and pulmonary HTN who presents to ED complaining of constant moderate generalized vomiting onset for 8 days. Sx improved by Amoxicillin he received previously in the ER. Associated Sx include Abd pain, diarrhea, nausea, cough, and myalgias. Denies fever. Was seen here for similar Sx 8 days ago and Dx with \"stomach flu\" and states that he believes this to be correct but that his Sx have not abated. Denies any further complaints or symptoms at the moment. PCP: Tl Amaya MD 
 
Current Outpatient Medications Medication Sig Dispense Refill  ibuprofen (MOTRIN) 600 mg tablet Take 1 Tab by mouth every six (6) hours as needed for Pain. 30 Tab 0  
 amoxicillin 500 mg tab Take 500 mg by mouth three (3) times daily. 30 Tab 0  
 bumetanide (BUMEX) 1 mg tablet Take 1 Tab by mouth two (2) times a day. 60 Tab 0  
 famotidine (PEPCID) 20 mg tablet Take 20 mg by mouth two (2) times daily as needed.  sacubitril-valsartan (ENTRESTO) 24 mg/26 mg tablet Take 1 Tab by mouth two (2) times a day. 60 Tab 3  carvedilol (COREG) 6.25 mg tablet Take  by mouth two (2) times daily (with meals).  omeprazole (PRILOSEC) 20 mg capsule Take 1 Cap by mouth daily. 30 Cap 3  
 sildenafil, antihypertensive, (REVATIO) 20 mg tablet Take 20 mg by mouth three (3) times daily. Past History Past Medical History: 
Past Medical History:  
Diagnosis Date  [facility]congest[facility]  Hypertension  [facility]  
 Pulmonary HTN ([facility]  Sleep apnea Past Surgical History: 
History reviewed. No pertinent surgical history. Family History: 
History reviewed. No pertinent family history. Social History: 
Social History Tobacco Use  Smoking status: Never Smoker  Smokeless tobacco: Never Used Substance Use Topics  Alcohol use: Yes Comment: Social drinker  Drug use: No  
 
 
Allergies: 
No Known Allergies Review of Systems Review of Systems Constitutional: Negative for activity change, fatigue and fever. HENT: Negative for congestion and rhinorrhea. Eyes: Negative for visual disturbance. Respiratory: Positive for cough. Negative for shortness of breath. Cardiovascular: Negative for chest pain and palpitations. Gastrointestinal: Positive for abdominal pain, diarrhea, nausea and vomiting. Genitourinary: Negative for dysuria and hematuria. Musculoskeletal: Positive for myalgias. Skin: Negative for rash. Neurological: Negative for dizziness, weakness and light-headedness. All other systems reviewed and are negative. Physical Exam  
 
Visit Vitals /85 (BP 1 Location: Left arm, BP Patient Position: At rest) Pulse 92 Temp 97.4 °F (36.3 °C) Resp 16 Ht 5' 6\" (1.676 m) Wt 95.3 kg (210 lb) SpO2 96% BMI 33.89 kg/m² Physical Exam  
Constitutional: He is oriented to person, place, and time. He appears well-developed and well-nourished. No distress. HENT:  
Head: Normocephalic and atraumatic.   
Right Ear: External ear normal.  
Left Ear: External ear normal.  
Nose: Nose normal.  
Mouth/Throat: Oropharynx is clear and moist.  
Eyes: Conjunctivae and EOM are normal. Pupils are equal, round, and reactive to light. No scleral icterus. Neck: Normal range of motion. Neck supple. No JVD present. No tracheal deviation present. No thyromegaly present. Cardiovascular: Normal rate, regular rhythm, normal heart sounds and intact distal pulses. Exam reveals no gallop and no friction rub. No murmur heard. Pulmonary/Chest: Effort normal and breath sounds normal. He exhibits no tenderness. Abdominal: Soft. Bowel sounds are normal. He exhibits no distension. There is no tenderness. There is no rebound and no guarding. Musculoskeletal: Normal range of motion. He exhibits no edema or tenderness. Lymphadenopathy:  
  He has no cervical adenopathy. Neurological: He is alert and oriented to person, place, and time. No cranial nerve deficit. Coordination normal.  
No sensory loss, Gait normal, Motor 5/5 Skin: Skin is warm and dry. Psychiatric: He has a normal mood and affect. His behavior is normal. Judgment and thought content normal.  
Nursing note and vitals reviewed. Diagnostic Study Results Labs - No results found for this or any previous visit (from the past 12 hour(s)). Radiologic Studies - No orders to display No results found. Medical Decision Making I am the first provider for this patient. I reviewed the vital signs, available nursing notes, past medical history, past surgical history, family history and social history. Vital Signs-Reviewed the patient's vital signs. Pulse Oximetry Analysis -  Patient is 96% O2 on RA, indicating adequate oxygenation. Cardiac Monitor: 
Rate: 92 bpm 
 
Records Reviewed: Old medical records and nursing notes (Time of Review: 10:20 AM) ED Course: Progress Notes, Reevaluation, and Consults: 
  
Provider Notes (Medical Decision Making):  
 
 
Diagnosis Clinical Impression: No diagnosis found. Disposition: DC Follow-up Information None Medication List  
  
ASK your doctor about these medications amoxicillin 500 mg Tab Take 500 mg by mouth three (3) times daily. bumetanide 1 mg tablet Commonly known as:  Pati Luc Take 1 Tab by mouth two (2) times a day. carvedilol 6.25 mg tablet Commonly known as:  COREG 
  
famotidine 20 mg tablet Commonly known as:  PEPCID 
  
ibuprofen 600 mg tablet Commonly known as:  MOTRIN Take 1 Tab by mouth every six (6) hours as needed for Pain. omeprazole 20 mg capsule Commonly known as:  PRILOSEC Take 1 Cap by mouth daily. REVATIO 20 mg tablet Generic drug:  sildenafil (antihypertensive) 
  
sacubitril-valsartan 24-26 mg tablet Commonly known as:  ENTRESTO Take 1 Tab by mouth two (2) times a day. 
  
  
 
_______________________________ Attestations: 
Scribe Attestation Mil Cunningham acting as a scribe for and in the presence of Caden Joyce MD     
November 26, 2018 at 10:20 AM 
    
Provider Attestation:     
I personally performed the services described in the documentation, reviewed the documentation, as recorded by the scribe in my presence, and it accurately and completely records my words and actions. November 26, 2018 at 10:20 AM - Caden Joyce MD   
_______________________________

## 2018-11-26 NOTE — ED NOTES
I have reviewed discharge instructions with the patient. The patient verbalized understanding. Current Discharge Medication List  
  
START taking these medications Details  
ondansetron (ZOFRAN ODT) 8 mg disintegrating tablet Take 1 Tab by mouth every eight (8) hours as needed for Nausea. Qty: 10 Tab, Refills: 0 Patient armband removed and shredded

## 2018-12-27 ENCOUNTER — HOSPITAL ENCOUNTER (EMERGENCY)
Age: 48
Discharge: HOME | End: 2018-12-27
Attending: EMERGENCY MEDICINE | Admitting: EMERGENCY MEDICINE
Payer: MEDICARE

## 2018-12-27 ENCOUNTER — APPOINTMENT (OUTPATIENT)
Dept: GENERAL RADIOLOGY | Age: 48
End: 2018-12-27
Attending: EMERGENCY MEDICINE
Payer: MEDICARE

## 2018-12-27 VITALS
WEIGHT: 218 LBS | OXYGEN SATURATION: 99 % | TEMPERATURE: 97.7 F | HEIGHT: 67 IN | BODY MASS INDEX: 34.21 KG/M2 | DIASTOLIC BLOOD PRESSURE: 82 MMHG | HEART RATE: 95 BPM | SYSTOLIC BLOOD PRESSURE: 113 MMHG

## 2018-12-27 DIAGNOSIS — M25.579 ANKLE PAIN, UNSPECIFIED CHRONICITY, UNSPECIFIED LATERALITY: Primary | ICD-10-CM

## 2018-12-27 DIAGNOSIS — M10.9 ACUTE GOUT, UNSPECIFIED CAUSE, UNSPECIFIED SITE: ICD-10-CM

## 2018-12-27 PROCEDURE — 99283 EMERGENCY DEPT VISIT LOW MDM: CPT

## 2018-12-27 PROCEDURE — A9270 NON-COVERED ITEM OR SERVICE: HCPCS | Performed by: EMERGENCY MEDICINE

## 2018-12-27 PROCEDURE — 73630 X-RAY EXAM OF FOOT: CPT

## 2018-12-27 PROCEDURE — 74011636637 HC RX REV CODE- 636/637: Performed by: EMERGENCY MEDICINE

## 2018-12-27 PROCEDURE — 74011250637 HC RX REV CODE- 250/637: Performed by: EMERGENCY MEDICINE

## 2018-12-27 RX ORDER — PREDNISONE 20 MG/1
60 TABLET ORAL DAILY
Qty: 15 TAB | Refills: 0 | Status: SHIPPED | OUTPATIENT
Start: 2018-12-27 | End: 2019-01-01

## 2018-12-27 RX ORDER — PREDNISONE 20 MG/1
60 TABLET ORAL
Status: COMPLETED | OUTPATIENT
Start: 2018-12-27 | End: 2018-12-27

## 2018-12-27 RX ORDER — OXYCODONE AND ACETAMINOPHEN 5; 325 MG/1; MG/1
1 TABLET ORAL
Status: COMPLETED | OUTPATIENT
Start: 2018-12-27 | End: 2018-12-27

## 2018-12-27 RX ORDER — OXYCODONE AND ACETAMINOPHEN 5; 325 MG/1; MG/1
1 TABLET ORAL
Qty: 10 TAB | Refills: 0 | Status: SHIPPED | OUTPATIENT
Start: 2018-12-27 | End: 2019-01-18 | Stop reason: ALTCHOICE

## 2018-12-27 RX ADMIN — OXYCODONE AND ACETAMINOPHEN 1 TABLET: 5; 325 TABLET ORAL at 09:36

## 2018-12-27 RX ADMIN — PREDNISONE 60 MG: 20 TABLET ORAL at 09:36

## 2018-12-27 NOTE — DISCHARGE INSTRUCTIONS
Please see your doctor or clinic in 2-3 days. Please return for increased pain, swelling or any other concerns.

## 2018-12-27 NOTE — ED PROVIDER NOTES
EMERGENCY DEPARTMENT HISTORY AND PHYSICAL EXAM    8:41 AM      Date: 12/27/2018  Patient Name: Samir Arambula    History of Presenting Illness     Chief Complaint   Patient presents with    Foot Pain         History Provided By: Patient    Chief Complaint: Foot Pain   Duration: 2 Days  Timing:  Constant  Location: Left foot   Quality: Aching  Severity: Moderate  Modifying Factors: worse after \"catching a cramp\"  Associated Symptoms: rash (generalized)       Additional History (Context): Samir Arambula is a 51 y.o. male with PMHx of HTN, sleep apnea, CHF, pulmonary HTN who presents with constant aching moderate left foot pain that started x 2 days ago. Associated Sx include a generalized rash. Pt states he \"caught a cramp\" in his left ankle and started to have pain in his left foot. States he \"can't walk\". Denies recent trauma or fall. Denies any further complaints or symptoms at the moment. PCP: Vermell Cowden, MD    Current Outpatient Medications   Medication Sig Dispense Refill    oxyCODONE-acetaminophen (PERCOCET) 5-325 mg per tablet Take 1 Tab by mouth every four (4) hours as needed for Pain. Max Daily Amount: 6 Tabs. 10 Tab 0    predniSONE (DELTASONE) 20 mg tablet Take 60 mg by mouth daily for 5 days. With Breakfast 15 Tab 0    ondansetron (ZOFRAN ODT) 8 mg disintegrating tablet Take 1 Tab by mouth every eight (8) hours as needed for Nausea. 10 Tab 0    bumetanide (BUMEX) 1 mg tablet Take 1 Tab by mouth two (2) times a day. 60 Tab 0    sacubitril-valsartan (ENTRESTO) 24 mg/26 mg tablet Take 1 Tab by mouth two (2) times a day. 60 Tab 3    carvedilol (COREG) 6.25 mg tablet Take  by mouth two (2) times daily (with meals).  ibuprofen (MOTRIN) 600 mg tablet Take 1 Tab by mouth every six (6) hours as needed for Pain. 30 Tab 0    amoxicillin 500 mg tab Take 500 mg by mouth three (3) times daily. 30 Tab 0    famotidine (PEPCID) 20 mg tablet Take 20 mg by mouth two (2) times daily as needed.       omeprazole (PRILOSEC) 20 mg capsule Take 1 Cap by mouth daily. 30 Cap 3    sildenafil, antihypertensive, (REVATIO) 20 mg tablet Take 20 mg by mouth three (3) times daily. Past History     Past Medical History:  Past Medical History:   Diagnosis Date    [facility] [place] [facility]    Hypertension     [facility]    [facility] ([facility]     Sleep apnea        Past Surgical History:  History reviewed. No pertinent surgical history. Family History:  History reviewed. No pertinent family history. Social History:  Social History     Tobacco Use    Smoking status: Never Smoker    Smokeless tobacco: Never Used   Substance Use Topics    Alcohol use: Yes     Comment: Social drinker    Drug use: No       Allergies:  No Known Allergies      Review of Systems       Review of Systems   Constitutional:        Pt denies other acute medical complaints or concerns. Musculoskeletal:        Positive for foot pain    Skin: Positive for rash. All other systems reviewed and are negative. Physical Exam     Visit Vitals  /82   Pulse 95   Temp 97.7 °F (36.5 °C)   Ht 5' 7\" (1.702 m)   Wt 98.9 kg (218 lb)   SpO2 99%   BMI 34.14 kg/m²         Physical Exam   Constitutional: He is oriented to person, place, and time. He appears well-developed and well-nourished. HENT:   Head: Normocephalic and atraumatic. Eyes: Conjunctivae are normal.   Cardiovascular: Normal rate and regular rhythm. Pulmonary/Chest: Effort normal and breath sounds normal.   Musculoskeletal:   Positive for diffuse swelling in medial left great toe    Neurological: He is alert and oriented to person, place, and time. Skin: Skin is warm. Psychiatric: He has a normal mood and affect. Vitals reviewed. Diagnostic Study Results     Labs -  No results found for this or any previous visit (from the past 12 hour(s)).     Radiologic Studies -   XR FOOT LT MIN 3 V Final Result   IMPRESSION:        1. No acute fracture or subluxation. 2.  Slight developing osteoarthrosis at the 1st metatarsophalangeal joint. Medical Decision Making   I am the first provider for this patient. I reviewed the vital signs, available nursing notes, past medical history, past surgical history, family history and social history. Vital Signs-Reviewed the patient's vital signs. Records Reviewed: Nursing Notes and Old Medical Records (Time of Review: 8:41 AM)    ED Course: Progress Notes, Reevaluation, and Consults:    Provider Notes (Medical Decision Making): Given cardiac disease and certain renal function. Will do steroid pain medication rather than NSAIDS. Diagnosis     Clinical Impression:   1. Ankle pain, unspecified chronicity, unspecified laterality    2. Acute gout, unspecified cause, unspecified site        Disposition: discharged. Follow-up Information     Follow up With Specialties Details Why Contact Jason Robert MD Family Practice Schedule an appointment as soon as possible for a visit For [address] [place] [address]  [address] [facility] [address]      [address] [facility] DEPT Emergency Medicine Go to As needed, If symptoms worsen [address] [facility]              Medication List      START taking these medications    oxyCODONE-acetaminophen 5-325 mg per tablet  Commonly known as:  PERCOCET  Take 1 Tab by mouth every four (4) hours as needed for Pain. Max Daily Amount: 6 Tabs. predniSONE 20 mg tablet  Commonly known as:  DELTASONE  Take 60 mg by mouth daily for 5 days. With Breakfast        ASK your doctor about these medications    amoxicillin 500 mg Tab  Take 500 mg by mouth three (3) times daily. bumetanide 1 mg tablet  Commonly known as:  BUMEX  Take 1 Tab by mouth two (2) times a day.      carvedilol 6.25 mg tablet  Commonly known as:  COREG     famotidine 20 mg tablet  Commonly known as:  PEPCID     ibuprofen 600 mg tablet  Commonly known as:  MOTRIN  Take 1 Tab by mouth every six (6) hours as needed for Pain. omeprazole 20 mg capsule  Commonly known as:  PRILOSEC  Take 1 Cap by mouth daily. ondansetron 8 mg disintegrating tablet  Commonly known as:  ZOFRAN ODT  Take 1 Tab by mouth every eight (8) hours as needed for Nausea. REVATIO 20 mg tablet  Generic drug:  sildenafil (antihypertensive)     sacubitril-valsartan 24-26 mg tablet  Commonly known as:  ENTRESTO  Take 1 Tab by mouth two (2) times a day. Where to Get Your Medications      Information about where to get these medications is not yet available    Ask your nurse or doctor about these medications  · oxyCODONE-acetaminophen 5-325 mg per tablet  · predniSONE 20 mg tablet     Sx not c/w DVT- likely gout- will give supportive care and advise of need for further work up  _______  ________________________       Elizabeth Bedolla (Aj) acting as a scribe for and in the presence of Holly Zapata MD      December 27, 2018 at 8:41 AM       Provider Attestation:      I personally performed the services described in the documentation, reviewed the documentation, as recorded by the scribe in my presence, and it accurately and completely records my words and actions.  December 27, 2018 at 8:41 AM - Holly Zapata MD        _______________________________

## 2018-12-27 NOTE — ED NOTES
Current Discharge Medication List      START taking these medications    Details   oxyCODONE-acetaminophen (PERCOCET) 5-325 mg per tablet Take 1 Tab by mouth every four (4) hours as needed for Pain. Max Daily Amount: 6 Tabs. Qty: 10 Tab, Refills: 0    Associated Diagnoses: Acute gout, unspecified cause, unspecified site      predniSONE (DELTASONE) 20 mg tablet Take 60 mg by mouth daily for 5 days. With Breakfast  Qty: 15 Tab, Refills: 0           Patient armband removed and shredded. Prescriptions given and reviewed with patient.

## 2019-01-18 ENCOUNTER — OFFICE VISIT (OUTPATIENT)
Dept: FAMILY MEDICINE CLINIC | Age: 49
End: 2019-01-18

## 2019-01-18 ENCOUNTER — HOSPITAL ENCOUNTER (OUTPATIENT)
Dept: LAB | Age: 49
Discharge: HOME | End: 2019-01-18
Payer: MEDICARE

## 2019-01-18 VITALS
BODY MASS INDEX: 34.25 KG/M2 | OXYGEN SATURATION: 98 % | HEART RATE: 94 BPM | HEIGHT: 67 IN | WEIGHT: 218.2 LBS | DIASTOLIC BLOOD PRESSURE: 72 MMHG | SYSTOLIC BLOOD PRESSURE: 130 MMHG | TEMPERATURE: 97.9 F | RESPIRATION RATE: 17 BRPM

## 2019-01-18 DIAGNOSIS — M79.675 TOE PAIN, LEFT: ICD-10-CM

## 2019-01-18 DIAGNOSIS — M79.675 TOE PAIN, LEFT: Primary | ICD-10-CM

## 2019-01-18 LAB — URATE SERPL-MCNC: 9.3 MG/DL (ref 2.6–7.2)

## 2019-01-18 PROCEDURE — 84550 ASSAY OF BLOOD/URIC ACID: CPT

## 2019-01-18 PROCEDURE — 36415 COLL VENOUS BLD VENIPUNCTURE: CPT

## 2019-01-18 RX ORDER — BUMETANIDE 1 MG/1
1 TABLET ORAL 2 TIMES DAILY
Qty: 180 TAB | Refills: 0 | Status: SHIPPED | OUTPATIENT
Start: 2019-01-18

## 2019-01-18 RX ORDER — CARVEDILOL 6.25 MG/1
6.25 TABLET ORAL 2 TIMES DAILY WITH MEALS
Qty: 90 TAB | Refills: 3 | Status: CANCELLED | OUTPATIENT
Start: 2019-01-18

## 2019-01-18 RX ORDER — CARVEDILOL 6.25 MG/1
6.25 TABLET ORAL 2 TIMES DAILY WITH MEALS
Qty: 180 TAB | Refills: 1 | Status: SHIPPED | OUTPATIENT
Start: 2019-01-18

## 2019-01-18 NOTE — PROGRESS NOTES
HISTORY OF PRESENT ILLNESS    Christofer Garza is a 51y.o. year old male here today for: follow up Emergency Department visit (patient of Dr. Tatum Ma) 12-27-18. Reviewed ED note with patient. He finished prednisone and took Norco prn pain. Patient continuing to experience mild left great toe pain 2/10----- 7/10 with direct pressure. Left great toe pain     Onset one month per patient  Context ED visit 12-27-18  Site left great toe  Duration  Approximately one month   Type of pain or sensation aching  Associated symptoms none  Severity 2/10  Exacerbating factors: steak and wine around the holidays and he noticed pain in left foot and left great toe prompting ED visit. Relieving factors time and rest per patient. ROS: denies swelling in left great toe, denies eating purine rich foods but does remember eating steak and drinking alcohol prior to his ED visit. PMH: HTN, sleep apnea, CHF, pulmonary HTN  Requesting refill for bumetanide and carvedilol     Current Outpatient Medications   Medication Sig Dispense Refill    bumetanide (BUMEX) 1 mg tablet Take 1 Tab by mouth two (2) times a day. 60 Tab 0    sacubitril-valsartan (ENTRESTO) 24 mg/26 mg tablet Take 1 Tab by mouth two (2) times a day. 60 Tab 3    carvedilol (COREG) 6.25 mg tablet Take  by mouth two (2) times daily (with meals).  sildenafil, antihypertensive, (REVATIO) 20 mg tablet Take 20 mg by mouth three (3) times daily.  ondansetron (ZOFRAN ODT) 8 mg disintegrating tablet Take 1 Tab by mouth every eight (8) hours as needed for Nausea. 10 Tab 0    ibuprofen (MOTRIN) 600 mg tablet Take 1 Tab by mouth every six (6) hours as needed for Pain. 30 Tab 0    famotidine (PEPCID) 20 mg tablet Take 20 mg by mouth two (2) times daily as needed.  omeprazole (PRILOSEC) 20 mg capsule Take 1 Cap by mouth daily.  30 Cap 3     Patient Active Problem List   Diagnosis Code    Hypertension I10    Sleep apnea G47.30    CHF (congestive heart failure) (HCC) I50.9  PTSD (post-traumatic stress disorder) F43.10    Pulmonary HTN ([facility] [address] I27.20    Severe obesity (HCC) E66.01     Reviewed past medical, family and social history  BP Readings from Last 3 Encounters:   01/18/19 130/72   12/27/18 113/82   11/26/18 125/85     Wt Readings from Last 3 Encounters:   01/18/19 218 lb 3.2 oz (99 kg)   12/27/18 218 lb (98.9 kg)   11/26/18 210 lb (95.3 kg)         OBJECTIVE:  Awake and alert in no acute distress  Lungs clear throughout  S1 S2 RRR without ectopy or murmur auscultated. Extremities: no clubbing, cyanosis, peripheral edema  Musculoskeletal: moderate TTP left metatarsal head no erythema no warmth to touch mild edema lateral aspect  No gait abnormalities  Visit Vitals  /72 (BP 1 Location: Left arm, BP Patient Position: Sitting)   Pulse 94   Temp 97.9 °F (36.6 °C) (Oral)   Resp 17   Ht 5' 7\" (1.702 m)   Wt 218 lb 3.2 oz (99 kg)   SpO2 98%   BMI 34.17 kg/m²     Diagnoses and all orders for this visit:    Toe pain, left  -     URIC ACID; Future    Other orders  -     Cancel: carvedilol (COREG) 6.25 mg tablet; Take 1 Tab by mouth two (2) times daily (with meals). , Normal, Disp-90 Tab, R-3  -     carvedilol (COREG) 6.25 mg tablet; Take 1 Tab by mouth two (2) times daily (with meals). , Normal, Disp-180 Tab, R-1    Anticipatory guidance regarding health promotion for this age range and patient verbalizes understanding. Purine restricted diet  Patient has appointment with cardiology practice in [place] in the next month  I have discussed the diagnosis with the patient and the intended plan as seen in the above orders. The patient has received an after-visit summary and questions were answered concerning future plans. I have discussed medication side effects and warnings with the patient as well. Follow-up Disposition:  Return if symptoms worsen or fail to improve.

## 2019-01-18 NOTE — PROGRESS NOTES
Chief Complaint   Patient presents with    Other     complaints of pain and swelling in the left greater toe     1. Have you been to the ER, urgent care clinic since your last visit? Hospitalized since your last visit? [facility] (/[facility]) ED 12/26 for foot pain     2. Have you seen or consulted any other health care providers outside of the [facility] since your last visit? Include any pap smears or colon screening.   No

## 2019-03-11 ENCOUNTER — OFFICE VISIT (OUTPATIENT)
Dept: FAMILY MEDICINE CLINIC | Age: 49
End: 2019-03-11

## 2019-03-11 VITALS
TEMPERATURE: 98.1 F | BODY MASS INDEX: 34.72 KG/M2 | HEART RATE: 82 BPM | SYSTOLIC BLOOD PRESSURE: 113 MMHG | RESPIRATION RATE: 18 BRPM | DIASTOLIC BLOOD PRESSURE: 83 MMHG | HEIGHT: 67 IN | WEIGHT: 221.2 LBS | OXYGEN SATURATION: 98 %

## 2019-03-11 DIAGNOSIS — M79.675 PAIN OF TOE OF LEFT FOOT: ICD-10-CM

## 2019-03-11 DIAGNOSIS — K21.9 GASTROESOPHAGEAL REFLUX DISEASE, ESOPHAGITIS PRESENCE NOT SPECIFIED: ICD-10-CM

## 2019-03-11 DIAGNOSIS — M21.612 BUNION OF GREAT TOE OF LEFT FOOT: Primary | ICD-10-CM

## 2019-03-11 RX ORDER — PANTOPRAZOLE SODIUM 40 MG/1
40 TABLET, DELAYED RELEASE ORAL DAILY
Qty: 30 TAB | Refills: 6 | Status: SHIPPED | OUTPATIENT
Start: 2019-03-11 | End: 2019-08-27

## 2019-03-11 NOTE — PROGRESS NOTES
Patient here for abdominal Pain/Bloating, Toe Pain        1. Have you been to the ER, urgent care clinic since your last visit? Hospitalized since your last visit? No    2. Have you seen or consulted any other health care providers outside of the [address] [place] [address] since your last visit? Include any pap smears or colon screening.  Cardiologist- Dr Rose Toure

## 2019-03-11 NOTE — PROGRESS NOTES
HISTORY OF PRESENT ILLNESS  Dipak Davila is a 51 y.o. male. HPI  Patient is here today for evaluation and treatment of: Toe Pain, Abdominal Pain/ Bloating: Toe Pain: has a bunion deformity at the left great toe Toe became swollen recently because of the pain, he went to the ED for this. There was a question of gout. Pt was unable to walk on it. Sx improved but worsened but about 3 weeks ago. Had a uric acid level in January that was elevated. Abdominal Pain/ Bloating:  Pt has been under the care of GI. Saw Connor CERVANTES And had upper endo He vomits sometimes;  Vomited 2 nights ago . He does not drink much alcohol. He does not know if certain foods will aggravate sx. Has Had an US of the abdomen. He was prescribed protonix in [place]. Current Outpatient Medications:     carvedilol (COREG) 6.25 mg tablet, Take 1 Tab by mouth two (2) times daily (with meals). , Disp: 180 Tab, Rfl: 1    bumetanide (BUMEX) 1 mg tablet, Take 1 Tab by mouth two (2) times a day., Disp: 180 Tab, Rfl: 0    sacubitril-valsartan (ENTRESTO) 24 mg/26 mg tablet, Take 1 Tab by mouth two (2) times a day., Disp: 60 Tab, Rfl: 3    sildenafil, antihypertensive, (REVATIO) 20 mg tablet, Take 20 mg by mouth three (3) times daily. , Disp: , Rfl:        PMH,  Meds, Allergies, Family History, Social history reviewed      Review of Systems   Constitutional: Negative for chills and fever. Cardiovascular: Negative for chest pain and palpitations. Physical Exam   Visit Vitals  /83 (BP 1 Location: Left arm, BP Patient Position: Sitting)   Pulse 82   Temp 98.1 °F (36.7 °C) (Oral)   Resp 18   Ht 5' 7\" (1.702 m)   Wt 221 lb 3.2 oz (100.3 kg)   SpO2 98%   BMI 34.64 kg/m²     General appearance: alert, cooperative, no distress, appears stated age  Lungs: clear to auscultation bilaterally  Heart: regular rate and rhythm, S1, S2 normal, no murmur, click, rub or gallop  Abdomen: soft, non-tender.  Bowel sounds normal. No masses, no organomegaly  Extremities: extremities normal, atraumatic, no cyanosis or edema  Left great toe with a bunion deformity; No warmth, no swelling; No palpable tenderness    ASSESSMENT and PLAN    ICD-10-CM ICD-9-CM    1. Bunion of great toe of left foot M21.612 727.1    2. Gastroesophageal reflux disease, esophagitis presence not specified K21.9 530.81    3. Pain of toe of left foot- likely element of gout given elevated uric acid. M79.675 729.5        As above, not controlled   treatment plan as listed below  Orders Placed This Encounter    pantoprazole (PROTONIX) 40 mg tablet     Restart protonix  That sx of gout are currently stable; will hold off on acute treatment for gout at this time. Will still seek podiatry consult for bunion deformity  Follow-up Disposition:  Return in about 4 months (around 7/11/2019) for well exam .  An After Visit Summary was printed and given to the patient. This has been fully explained to the patient, who indicates understanding.

## 2019-04-19 ENCOUNTER — APPOINTMENT (OUTPATIENT)
Dept: GENERAL RADIOLOGY | Age: 49
End: 2019-04-19
Attending: PHYSICIAN ASSISTANT
Payer: MEDICARE

## 2019-04-19 ENCOUNTER — APPOINTMENT (OUTPATIENT)
Dept: VASCULAR SURGERY | Age: 49
End: 2019-04-19
Attending: PHYSICIAN ASSISTANT
Payer: MEDICARE

## 2019-04-19 ENCOUNTER — HOSPITAL ENCOUNTER (EMERGENCY)
Age: 49
Discharge: HOME | End: 2019-04-19
Attending: EMERGENCY MEDICINE
Payer: MEDICARE

## 2019-04-19 VITALS
BODY MASS INDEX: 33.75 KG/M2 | WEIGHT: 210 LBS | TEMPERATURE: 99 F | OXYGEN SATURATION: 97 % | HEART RATE: 104 BPM | RESPIRATION RATE: 18 BRPM | DIASTOLIC BLOOD PRESSURE: 83 MMHG | SYSTOLIC BLOOD PRESSURE: 115 MMHG | HEIGHT: 66 IN

## 2019-04-19 DIAGNOSIS — M10.9 ACUTE GOUT OF RIGHT ANKLE, UNSPECIFIED CAUSE: Primary | ICD-10-CM

## 2019-04-19 DIAGNOSIS — M25.571 ACUTE RIGHT ANKLE PAIN: ICD-10-CM

## 2019-04-19 LAB
ANION GAP SERPL CALC-SCNC: 5 MMOL/L (ref 3–18)
APPEARANCE FLD: ABNORMAL
BASOPHILS # BLD: 0 K/UL (ref 0–0.1)
BASOPHILS NFR BLD: 0 % (ref 0–2)
BNP SERPL-MCNC: 3324 PG/ML (ref 0–450)
BODY FLD TYPE: NORMAL
BUN SERPL-MCNC: 17 MG/DL (ref 7–18)
BUN/CREAT SERPL: 10 (ref 12–20)
CALCIUM SERPL-MCNC: 10 MG/DL (ref 8.5–10.1)
CHLORIDE SERPL-SCNC: 99 MMOL/L (ref 100–108)
CO2 SERPL-SCNC: 33 MMOL/L (ref 21–32)
COLOR FLD: ABNORMAL
CREAT SERPL-MCNC: 1.65 MG/DL (ref 0.6–1.3)
CRP SERPL-MCNC: 5 MG/DL (ref 0–0.3)
CRYSTALS FLD MICRO: NORMAL
DIFFERENTIAL METHOD BLD: ABNORMAL
EOSINOPHIL # BLD: 0.2 K/UL (ref 0–0.4)
EOSINOPHIL NFR BLD: 3 % (ref 0–5)
EOSINOPHIL NFR FLD MANUAL: 0 %
ERYTHROCYTE [DISTWIDTH] IN BLOOD BY AUTOMATED COUNT: 15.9 % (ref 11.6–14.5)
ERYTHROCYTE [SEDIMENTATION RATE] IN BLOOD: 29 MM/HR (ref 0–15)
GLUCOSE FLD-MCNC: 2 MG/DL
GLUCOSE SERPL-MCNC: 118 MG/DL (ref 74–99)
HCT VFR BLD AUTO: 42.1 % (ref 36–48)
HGB BLD-MCNC: 14.5 G/DL (ref 13–16)
LDH FLD L TO P-CCNC: 885 U/L
LYMPHOCYTES # BLD: 1.5 K/UL (ref 0.9–3.6)
LYMPHOCYTES NFR BLD: 19 % (ref 21–52)
LYMPHOCYTES NFR FLD: 9 %
MCH RBC QN AUTO: 29.1 PG (ref 24–34)
MCHC RBC AUTO-ENTMCNC: 34.4 G/DL (ref 31–37)
MCV RBC AUTO: 84.4 FL (ref 74–97)
MONOCYTES # BLD: 0.8 K/UL (ref 0.05–1.2)
MONOCYTES NFR BLD: 10 % (ref 3–10)
MONOCYTES NFR FLD: 0 %
NEUTROPHILS NFR FLD: 91 %
NEUTS BAND # FLD: 0 %
NEUTS SEG # BLD: 5.4 K/UL (ref 1.8–8)
NEUTS SEG NFR BLD: 68 % (ref 40–73)
NUC CELL # FLD: ABNORMAL /CU MM
PLATELET # BLD AUTO: 273 K/UL (ref 135–420)
PMV BLD AUTO: 10 FL (ref 9.2–11.8)
POTASSIUM SERPL-SCNC: 3.4 MMOL/L (ref 3.5–5.5)
PROT FLD-MCNC: 5.3 G/DL
RBC # BLD AUTO: 4.99 M/UL (ref 4.7–5.5)
RBC # FLD: ABNORMAL /CU MM
SODIUM SERPL-SCNC: 137 MMOL/L (ref 136–145)
SPECIMEN SOURCE FLD: ABNORMAL
SPECIMEN SOURCE FLD: NORMAL
WBC # BLD AUTO: 8 K/UL (ref 4.6–13.2)

## 2019-04-19 PROCEDURE — 85652 RBC SED RATE AUTOMATED: CPT

## 2019-04-19 PROCEDURE — 85025 COMPLETE CBC W/AUTO DIFF WBC: CPT

## 2019-04-19 PROCEDURE — 74011250636 HC RX REV CODE- 250/636

## 2019-04-19 PROCEDURE — 75810000054 HC ARTHOCENTISIS JOINT

## 2019-04-19 PROCEDURE — 74011250637 HC RX REV CODE- 250/637: Performed by: STUDENT IN AN ORGANIZED HEALTH CARE EDUCATION/TRAINING PROGRAM

## 2019-04-19 PROCEDURE — 89051 BODY FLUID CELL COUNT: CPT

## 2019-04-19 PROCEDURE — 89060 EXAM SYNOVIAL FLUID CRYSTALS: CPT

## 2019-04-19 PROCEDURE — 82945 GLUCOSE OTHER FLUID: CPT

## 2019-04-19 PROCEDURE — 83880 ASSAY OF NATRIURETIC PEPTIDE: CPT

## 2019-04-19 PROCEDURE — 87070 CULTURE OTHR SPECIMN AEROBIC: CPT

## 2019-04-19 PROCEDURE — 84157 ASSAY OF PROTEIN OTHER: CPT

## 2019-04-19 PROCEDURE — 99283 EMERGENCY DEPT VISIT LOW MDM: CPT

## 2019-04-19 PROCEDURE — A9270 NON-COVERED ITEM OR SERVICE: HCPCS | Performed by: STUDENT IN AN ORGANIZED HEALTH CARE EDUCATION/TRAINING PROGRAM

## 2019-04-19 PROCEDURE — 83615 LACTATE (LD) (LDH) ENZYME: CPT

## 2019-04-19 PROCEDURE — 96375 TX/PRO/DX INJ NEW DRUG ADDON: CPT

## 2019-04-19 PROCEDURE — 93971 EXTREMITY STUDY: CPT

## 2019-04-19 PROCEDURE — 96374 THER/PROPH/DIAG INJ IV PUSH: CPT

## 2019-04-19 PROCEDURE — 74011250636 HC RX REV CODE- 250/636: Performed by: EMERGENCY MEDICINE

## 2019-04-19 PROCEDURE — 71046 X-RAY EXAM CHEST 2 VIEWS: CPT

## 2019-04-19 PROCEDURE — 74011636637 HC RX REV CODE- 636/637: Performed by: STUDENT IN AN ORGANIZED HEALTH CARE EDUCATION/TRAINING PROGRAM

## 2019-04-19 PROCEDURE — 80048 BASIC METABOLIC PNL TOTAL CA: CPT

## 2019-04-19 PROCEDURE — 86140 C-REACTIVE PROTEIN: CPT

## 2019-04-19 RX ORDER — MORPHINE SULFATE 10 MG/ML
5 INJECTION, SOLUTION INTRAMUSCULAR; INTRAVENOUS ONCE
Status: COMPLETED | OUTPATIENT
Start: 2019-04-19 | End: 2019-04-19

## 2019-04-19 RX ORDER — OXYCODONE AND ACETAMINOPHEN 5; 325 MG/1; MG/1
1 TABLET ORAL
Qty: 12 TAB | Refills: 0 | Status: SHIPPED | OUTPATIENT
Start: 2019-04-19 | End: 2019-04-22

## 2019-04-19 RX ORDER — MORPHINE SULFATE 10 MG/ML
INJECTION, SOLUTION INTRAMUSCULAR; INTRAVENOUS
Status: COMPLETED
Start: 2019-04-19 | End: 2019-04-19

## 2019-04-19 RX ORDER — PREDNISONE 20 MG/1
40 TABLET ORAL
Status: COMPLETED | OUTPATIENT
Start: 2019-04-19 | End: 2019-04-19

## 2019-04-19 RX ORDER — MORPHINE SULFATE 4 MG/ML
4 INJECTION INTRAVENOUS
Status: DISCONTINUED | OUTPATIENT
Start: 2019-04-19 | End: 2019-04-19

## 2019-04-19 RX ORDER — PREDNISONE 20 MG/1
40 TABLET ORAL DAILY
Qty: 8 TAB | Refills: 0 | Status: SHIPPED | OUTPATIENT
Start: 2019-04-20 | End: 2019-04-24

## 2019-04-19 RX ORDER — ONDANSETRON 2 MG/ML
4 INJECTION INTRAMUSCULAR; INTRAVENOUS
Status: COMPLETED | OUTPATIENT
Start: 2019-04-19 | End: 2019-04-19

## 2019-04-19 RX ORDER — OXYCODONE AND ACETAMINOPHEN 5; 325 MG/1; MG/1
1 TABLET ORAL
Status: COMPLETED | OUTPATIENT
Start: 2019-04-19 | End: 2019-04-19

## 2019-04-19 RX ORDER — LIDOCAINE HYDROCHLORIDE 20 MG/ML
200 INJECTION, SOLUTION INFILTRATION; PERINEURAL ONCE
Status: COMPLETED | OUTPATIENT
Start: 2019-04-19 | End: 2019-04-19

## 2019-04-19 RX ADMIN — LIDOCAINE HYDROCHLORIDE 200 MG: 20 INJECTION, SOLUTION INFILTRATION; PERINEURAL at 15:30

## 2019-04-19 RX ADMIN — MORPHINE SULFATE 5 MG: 10 INJECTION, SOLUTION INTRAMUSCULAR; INTRAVENOUS at 15:30

## 2019-04-19 RX ADMIN — OXYCODONE HYDROCHLORIDE AND ACETAMINOPHEN 1 TABLET: 5; 325 TABLET ORAL at 14:51

## 2019-04-19 RX ADMIN — PREDNISONE 40 MG: 20 TABLET ORAL at 17:49

## 2019-04-19 RX ADMIN — ONDANSETRON 4 MG: 2 INJECTION INTRAMUSCULAR; INTRAVENOUS at 15:30

## 2019-04-19 NOTE — DISCHARGE INSTRUCTIONS
Your evaluation today showed likely gout. Please follow up with a doctor as discussed. The evaluation and treatment done today requires that you follow up with a physician for re-evaluation. Not following up could result in chronic pain/disability/injury. Medical problems can change over time and symptoms can get worse or new symptoms can develop over time, therefore, it is important that you follow up as we discussed or return immedediately to the ER. Diseases don't read textbooks and there are limitations to our evaluation and testing, so please immediately return to the ER if you have any concerns. Call the ER if you have any questions about what we discussed. Please visit [employer] for discounts on any prescriptions you may have. At the [facility] we are genuinely concerned about your health and comfort. You may be selected to participate in a patient satisfaction survey mailed to your home. We are excited about the opportunity to learn from your experience so we may continue to improve. In striving for the very best we believe good is not good enough, but if you rate us as EXCELLENT in all boxes, we have succeeded. We strive to provide EXCELLENT care to you and your family. We appreciate the opportunity to take care of you, and hope you do well. Foot Pain: Care Instructions  Your Care Instructions  Foot injuries that cause pain and swelling are fairly common. Almost all sports or home repair projects can cause a misstep that ends up as foot pain. Normal wear and tear, especially as you get older, also can cause foot pain. Most minor foot injuries will heal on their own, and home treatment is usually all you need to do. If you have a severe injury, you may need tests and treatment. Follow-up care is a key part of your treatment and safety. Be sure to make and go to all appointments, and call your doctor if you are having problems.  It's also a good idea to know your test results and keep a list of the medicines you take. How can you care for yourself at home? · Take pain medicines exactly as directed. ? If the doctor gave you a prescription medicine for pain, take it as prescribed. ? If you are not taking a prescription pain medicine, ask your doctor if you can take an over-the-counter medicine. · Rest and protect your foot. Take a break from any activity that may cause pain. · Put ice or a cold pack on your foot for 10 to 20 minutes at a time. Put a thin cloth between the ice and your skin. · Prop up the sore foot on a pillow when you ice it or anytime you sit or lie down during the next 3 days. Try to keep it above the level of your heart. This will help reduce swelling. · Your doctor may recommend that you wrap your foot with an elastic bandage. Keep your foot wrapped for as long as your doctor advises. · If your doctor recommends crutches, use them as directed. · Wear roomy footwear. · As soon as pain and swelling end, begin gentle exercises of your foot. Your doctor can tell you which exercises will help. When should you call for help? Call 911 anytime you think you may need emergency care. For example, call if:    · Your foot turns pale, white, blue, or cold.    Call your doctor now or seek immediate medical care if:    · You cannot move or stand on your foot.     · Your foot looks twisted or out of its normal position.     · Your foot is not stable when you step down.     · You have signs of infection, such as:  ? Increased pain, swelling, warmth, or redness. ? Red streaks leading from the sore area. ? Pus draining from a place on your foot. ? A fever.     · Your foot is numb or tingly.    Watch closely for changes in your health, and be sure to contact your doctor if:    · You do not get better as expected.     · You have bruises from an injury that last longer than 2 weeks. Where can you learn more?   Go to http://emiliano-madeleine.info/. Enter A334 in the search box to learn more about \"Foot Pain: Care Instructions. \"  Current as of: September 20, 2018  Content Version: 11.9  © 2006-2018 [email or web address]. Care instructions adapted under license by [employer] (which disclaims liability or warranty for this information). If you have questions about a medical condition or this instruction, always ask your healthcare professional. [place] [address] any warranty or liability for your use of this information. Patient Education        Gout: Care Instructions  Your Care Instructions    Gout is a form of arthritis caused by a buildup of uric acid crystals in a joint. It causes sudden attacks of pain, swelling, redness, and stiffness, usually in one joint, especially the big toe. Gout usually comes on without a cause. But it can be brought on by drinking alcohol (especially beer) or eating seafood and red meat. Taking certain medicines, such as diuretics or aspirin, also can bring on an attack of gout. Taking your medicines as prescribed and following up with your doctor regularly can help you avoid gout attacks in the future. Follow-up care is a key part of your treatment and safety. Be sure to make and go to all appointments, and call your doctor if you are having problems. It's also a good idea to know your test results and keep a list of the medicines you take. How can you care for yourself at home? · If the joint is swollen, put ice or a cold pack on the area for 10 to 20 minutes at a time. Put a thin cloth between the ice and your skin. · Prop up the sore limb on a pillow when you ice it or anytime you sit or lie down during the next 3 days. Try to keep it above the level of your heart. This will help reduce swelling. · Rest sore joints. Avoid activities that put weight or strain on the joints for a few days.  Take short rest breaks from your regular activities during the day. · Take your medicines exactly as prescribed. Call your doctor if you think you are having a problem with your medicine. · Take pain medicines exactly as directed. ? If the doctor gave you a prescription medicine for pain, take it as prescribed. ? If you are not taking a prescription pain medicine, ask your doctor if you can take an over-the-counter medicine. · Eat less seafood and red meat. · Check with your doctor before drinking alcohol. · Losing weight, if you are overweight, may help reduce attacks of gout. But do not go on a [employer]. \" Losing a lot of weight in a short amount of time can cause a gout attack. When should you call for help? Call your doctor now or seek immediate medical care if:    · You have a fever.     · The joint is so painful you cannot use it.     · You have sudden, unexplained swelling, redness, warmth, or severe pain in one or more joints.    Watch closely for changes in your health, and be sure to contact your doctor if:    · You have joint pain.     · Your symptoms get worse or are not improving after 2 or 3 days. Where can you learn more? Go to http://emiliano-madeleine.info/. Enter F970 in the search box to learn more about \"Gout: Care Instructions. \"  Current as of: Arminda 10, 2018  Content Version: 11.9  © 2154-6504 [email or web address]. Care instructions adapted under license by [employer] (which disclaims liability or warranty for this information). If you have questions about a medical condition or this instruction, always ask your healthcare professional. [address] any warranty or liability for your use of this information.

## 2019-04-19 NOTE — ED TRIAGE NOTES
The patient presents for evaluation of right lower extremity swelling that began this morning at approximately 0300. He is complaining of pain to his leg. Denies chest pain or shortness of breath.

## 2019-04-19 NOTE — ED PROVIDER NOTES
EMERGENCY DEPARTMENT HISTORY AND PHYSICAL EXAM 
 
2:33 PM 
 
 
Date: 4/19/2019 Patient Name: Elvia Garcia History of Presenting Illness Chief Complaint Patient presents with  Ankle swelling History Provided By: Patient and Patient's Wife Additional History (Context): Elvia Garcia is a 51 y.o. male with PMH significant for HTN, CKD Stage 3, chronic combined systolic-diastolic CHF, and pHTN who presents to [facility] ED with chief complaint of acute right ankle swelling. Patient reports around 3 am today, he developed this sharp, stabbing, constant right ankle pain. Patient rates ankle pain as a 9/10 that is not improved by anything but made worse by moving and bearing weight. Patient reports over the course of the day his right ankle became swollen and stiff. Patient reports that over the course of the day, the pain tracked up his right leg posteriorly. Patient denies any fevers/chills, headaches, dizziness, SOB, chest pain, palpitations, abdominal pain, n/v/c/d, or urinary symptoms. Patient denies any falls or trauma to his right foot/ankle. Patient denies any history of gout but was told a few years ago that he had a gout flare in his left big toe. PCP: Kylie Harris MD 
 
Current Outpatient Medications Medication Sig Dispense Refill  [START ON 4/20/2019] predniSONE (DELTASONE) 20 mg tablet Take 40 mg by mouth daily for 4 days. With Breakfast  Indications: acute inflammation of the joints due to gout attack 8 Tab 0  
 oxyCODONE-acetaminophen (PERCOCET) 5-325 mg per tablet Take 1 Tab by mouth every six (6) hours as needed for Pain for up to 3 days. Max Daily Amount: 4 Tabs. 12 Tab 0  
 pantoprazole (PROTONIX) 40 mg tablet Take 1 Tab by mouth daily. 30 Tab 6  carvedilol (COREG) 6.25 mg tablet Take 1 Tab by mouth two (2) times daily (with meals). 180 Tab 1  
 bumetanide (BUMEX) 1 mg tablet Take 1 Tab by mouth two (2) times a day.  180 Tab 0  
  sacubitril-valsartan (ENTRESTO) 24 mg/26 mg tablet Take 1 Tab by mouth two (2) times a day. 60 Tab 3  
 sildenafil, antihypertensive, (REVATIO) 20 mg tablet Take 20 mg by mouth three (3) times daily. Past History Past Medical History: 
Past Medical History:  
Diagnosis Date  CHF (congestive heart failure) ([facility] 12/2013 [facility]  Hypertension  [facility]  
 Pulmonary HTN ([facility]  Sleep apnea Past Surgical History: 
History reviewed. No pertinent surgical history. Family History: 
History reviewed. No pertinent family history. Social History: 
Social History Tobacco Use  Smoking status: Never Smoker  Smokeless tobacco: Never Used Substance Use Topics  Alcohol use: Yes Comment: Social drinker  Drug use: No  
 
 
Allergies: 
No Known Allergies Review of Systems Review of Systems Constitutional: Negative for activity change, appetite change, chills, diaphoresis, fatigue and fever. HENT: Positive for postnasal drip and rhinorrhea. Negative for congestion, ear pain, sinus pressure, sneezing, sore throat and trouble swallowing. Eyes: Negative for photophobia, pain, discharge and redness. Respiratory: Negative for cough, choking, chest tightness, shortness of breath and wheezing. Cardiovascular: Negative for chest pain, palpitations and leg swelling. Gastrointestinal: Negative for abdominal distention, abdominal pain, constipation, diarrhea and vomiting. Genitourinary: Negative for decreased urine volume, dysuria, flank pain, frequency, hematuria and urgency. Musculoskeletal: Positive for arthralgias, gait problem and joint swelling. Negative for back pain, myalgias, neck pain and neck stiffness. Skin: Negative for color change, pallor, rash and wound. Neurological: Negative for dizziness, tremors, seizures, syncope, weakness, light-headedness, numbness and headaches. Psychiatric/Behavioral: Negative for agitation, behavioral problems and confusion. Physical Exam  
 
Visit Vitals /83 (BP 1 Location: Left arm, BP Patient Position: At rest) Pulse (!) 104 Temp 99 °F (37.2 °C) Resp 18 Ht 5' 6\" (1.676 m) Wt 95.3 kg (210 lb) SpO2 97% BMI 33.89 kg/m² Physical Exam  
Constitutional: He is oriented to person, place, and time. He appears well-developed and well-nourished. He appears distressed. HENT:  
Head: Normocephalic and atraumatic. Nose: Nose normal.  
Mouth/Throat: Oropharynx is clear and moist. No oropharyngeal exudate. Eyes: Pupils are equal, round, and reactive to light. Conjunctivae and EOM are normal. No scleral icterus. Neck: Normal range of motion. Neck supple. No JVD present. No tracheal deviation present. No thyromegaly present. Cardiovascular: Normal rate, regular rhythm, normal heart sounds and intact distal pulses. Exam reveals no gallop and no friction rub. No murmur heard. Pulmonary/Chest: Effort normal and breath sounds normal. No stridor. No respiratory distress. He has no wheezes. He has no rales. He exhibits no tenderness. Abdominal: Soft. Bowel sounds are normal. He exhibits no distension and no mass. There is no tenderness. There is no rebound and no guarding. Musculoskeletal: Normal range of motion. He exhibits no edema, tenderness or deformity. Right angle with reduced ROM & swollen with exquisite tenderness to palpation. Left ankle with full ROM and normal.   
Lymphadenopathy:  
  He has no cervical adenopathy. Neurological: He is alert and oriented to person, place, and time. He has normal reflexes. No cranial nerve deficit. Skin: Skin is warm and dry. No rash noted. He is not diaphoretic. No erythema. No pallor. Psychiatric: He has a normal mood and affect. His behavior is normal. Thought content normal.  
 
Diagnostic Study Results Labs - Recent Results (from the past 12 hour(s)) CBC WITH AUTOMATED DIFF Collection Time: 04/19/19 12:40 PM  
Result Value Ref Range WBC 8.0 4.6 - 13.2 K/uL  
 RBC 4.99 4.70 - 5.50 M/uL  
 HGB 14.5 13.0 - 16.0 g/dL HCT 42.1 36.0 - 48.0 % MCV 84.4 74.0 - 97.0 FL  
 MCH 29.1 24.0 - 34.0 PG  
 MCHC 34.4 31.0 - 37.0 g/dL  
 RDW 15.9 (H) 11.6 - 14.5 % PLATELET 629 994 - 361 K/uL MPV 10.0 9.2 - 11.8 FL  
 NEUTROPHILS 68 40 - 73 % LYMPHOCYTES 19 (L) 21 - 52 % MONOCYTES 10 3 - 10 % EOSINOPHILS 3 0 - 5 % BASOPHILS 0 0 - 2 %  
 ABS. NEUTROPHILS 5.4 1.8 - 8.0 K/UL  
 ABS. LYMPHOCYTES 1.5 0.9 - 3.6 K/UL  
 ABS. MONOCYTES 0.8 0.05 - 1.2 K/UL  
 ABS. EOSINOPHILS 0.2 0.0 - 0.4 K/UL  
 ABS. BASOPHILS 0.0 0.0 - 0.1 K/UL  
 DF AUTOMATED METABOLIC PANEL, BASIC Collection Time: 04/19/19 12:40 PM  
Result Value Ref Range Sodium 137 136 - 145 mmol/L Potassium 3.4 (L) 3.5 - 5.5 mmol/L Chloride 99 (L) 100 - 108 mmol/L  
 CO2 33 (H) 21 - 32 mmol/L Anion gap 5 3.0 - 18 mmol/L Glucose 118 (H) 74 - 99 mg/dL BUN 17 7.0 - 18 MG/DL Creatinine 1.65 (H) 0.6 - 1.3 MG/DL  
 BUN/Creatinine ratio 10 (L) 12 - 20 GFR est AA 54 (L) >60 ml/min/1.73m2 GFR est non-AA 45 (L) >60 ml/min/1.73m2 Calcium 10.0 8.5 - 10.1 MG/DL  
NT-PRO BNP Collection Time: 04/19/19 12:40 PM  
Result Value Ref Range NT pro-BNP 3,324 (H) 0 - 450 PG/ML  
SED RATE (ESR) Collection Time: 04/19/19 12:40 PM  
Result Value Ref Range Sed rate, automated 29 (H) 0 - 15 mm/hr CELL COUNT AND DIFF, BODY FLUID Collection Time: 04/19/19  3:50 PM  
Result Value Ref Range BODY FLUID TYPE JOINT    
 FLUID COLOR RED    
 FLUID APPEARANCE TURBID    
 FLUID RBC CT. (A) NRRE /cu mm SPECIMEN GROSSLY BLOODY. RBC'S TOO NUMEROUS TO COUNT. FLUID NUCLEATED CELLS 25,667 (A) NRRE /cu mm  
 FLD NEUTROPHILS 91 % FLD BANDS 0 % FLD LYMPHS 9 % FLD MONOCYTES 0 % FLD EOSINS 0 % CRYSTALS, SYNOVIAL FLUID  Collection Time: 04/19/19  3:50 PM  
 Result Value Ref Range FLUID TYPE(7) JOINT Crystals, body fluid Monosodium Urate Crystals Present GLUCOSE, FLUID Collection Time: 04/19/19  3:50 PM  
Result Value Ref Range Fluid Type: JOINT Glucose, body fld. 2 MG/DL PROTEIN TOTAL, FLUID Collection Time: 04/19/19  3:50 PM  
Result Value Ref Range Fluid Type: JOINT FLUID Protein total, body fld. 5.3 g/dL CULTURE, BODY FLUID W GRAM STAIN Collection Time: 04/19/19  3:50 PM  
Result Value Ref Range Special Requests: NO SPECIAL REQUESTS    
 GRAM STAIN MANY WBC'S    
 GRAM STAIN NO ORGANISMS SEEN Culture result: PENDING Radiologic Studies -  
XR CHEST PA LAT Final Result IMPRESSION:  
  
No acute pulmonic disease. Cardiomegaly. Medical Decision Making It should be noted that I, Gayle Lopez MD will be the provider of record for this patient. I reviewed the vital signs, available nursing notes, past medical history, past surgical history, family history and social history. Vital Signs-Reviewed the patient's vital signs. Pulse Oximetry Analysis -  98% on room air Records Reviewed: Nursing Notes (Time of Review: 2:33 PM) ED Course: Progress Notes, Reevaluation, and Consults: 
Mr. Hero Allen is a 52 y/o -American male with PMH significant for HTN, CKD Stage 3, chronic combined systolic-diastolic CHF, and pHTN who presents with acute, sharp right ankle pain w/o trauma or injury. DDx: Septic Joint vs Gout vs Fracture Labs: CBC, CMP, NT-pro BNP, ESR, CRP, body fluid studies (culture, total protein, crystals, cell count) Imaging: CXR, RLE PVL Medications: IV Zofran 4 mg x1; IV Morphine 4 mg x1; PO Percocet 5-325 mg x1 Provider Notes (Medical Decision Making): 
Labs reviewed. CBC unremarkable for leukocytosis, left shift, or bandemia. CMP shows stable CKD, mild hypokalemia. NT-pro BNP elevated at >3000, however does not show signs of heart failure.  Given the exquisite tenderness of right ankle and such limited range of motion due to pain, will obtain ultrasound-guided joint aspiration and assess for infection vs gout. Will give IV zofran for nausea. Will treat pain appropriately. Joint aspiration shows findings of monosodium urate crystals that is consistent with acute gout flare. Will give PO prednisone 40 mg x1 now. Will discharge home on PO prednisone 40 mg for a total of 5 days. Will discharge home on PO Percocet 5-325 for pain control. Procedures: 
 
Core Measures: 
 
Critical Care Time:  
 
Diagnosis Clinical Impression: 1. Acute gout of right ankle, unspecified cause 2. Acute right ankle pain Disposition: Discharge Follow-up Information Follow up With Specialties Details Why Contact Info Jessica Posey MD Family Practice Schedule an appointment as soon as possible for a visit in 2 days ER Follow-up [address] [address] [facility] [id number] [phone] [facility] EMERGENCY DEPT Emergency Medicine Go to As needed, If symptoms worsen [address] [id number] 
[phone] Christina Beard DPM Podiatry Call today For follow up urgently [facility] [address] [phone] Patient's Medications Start Taking OXYCODONE-ACETAMINOPHEN (PERCOCET) 5-325 MG PER TABLET    Take 1 Tab by mouth every six (6) hours as needed for Pain for up to 3 days. Max Daily Amount: 4 Tabs. PREDNISONE (DELTASONE) 20 MG TABLET    Take 40 mg by mouth daily for 4 days. With Breakfast  Indications: acute inflammation of the joints due to gout attack Continue Taking BUMETANIDE (BUMEX) 1 MG TABLET    Take 1 Tab by mouth two (2) times a day. CARVEDILOL (COREG) 6.25 MG TABLET    Take 1 Tab by mouth two (2) times daily (with meals). PANTOPRAZOLE (PROTONIX) 40 MG TABLET    Take 1 Tab by mouth daily. SACUBITRIL-VALSARTAN (ENTRESTO) 24 MG/26 MG TABLET    Take 1 Tab by mouth two (2) times a day. SILDENAFIL, ANTIHYPERTENSIVE, (REVATIO) 20 MG TABLET    Take 20 mg by mouth three (3) times daily. These Medications have changed No medications on file Stop Taking No medications on file  
 
_______________________________ Mariella Bethea MD, PGY-2 [employer] [address] April 19, 2019 at 6:09 PM 
    
Provider Attestation:     
I personally performed the services described in the documentation, reviewed the documentation, as recorded by the scribe in my presence, and it accurately and completely records my words and actions. April 19, 2019 at 6:09 PM - Fabiola Cannon MD   
_______________________________

## 2019-04-24 LAB
BACTERIA SPEC CULT: NORMAL
GRAM STN SPEC: NORMAL
GRAM STN SPEC: NORMAL
SERVICE CMNT-IMP: NORMAL

## 2019-08-27 ENCOUNTER — OFFICE VISIT (OUTPATIENT)
Dept: FAMILY MEDICINE CLINIC | Age: 49
End: 2019-08-27

## 2019-08-27 VITALS
DIASTOLIC BLOOD PRESSURE: 71 MMHG | TEMPERATURE: 98.6 F | BODY MASS INDEX: 35.2 KG/M2 | HEIGHT: 66 IN | OXYGEN SATURATION: 98 % | SYSTOLIC BLOOD PRESSURE: 114 MMHG | HEART RATE: 74 BPM | WEIGHT: 219 LBS | RESPIRATION RATE: 18 BRPM

## 2019-08-27 DIAGNOSIS — Z13.6 SCREENING FOR CARDIOVASCULAR CONDITION: ICD-10-CM

## 2019-08-27 DIAGNOSIS — Z00.00 WELL ADULT EXAM: Primary | ICD-10-CM

## 2019-08-27 DIAGNOSIS — Z12.11 SCREENING FOR COLON CANCER: ICD-10-CM

## 2019-08-27 NOTE — PROGRESS NOTES
This is an Initial Medicare Annual Wellness Exam (AWV) (Performed 12 months after IPPE or effective date of Medicare Part B enrollment, Once in a lifetime)    I have reviewed the patient's medical history in detail and updated the computerized patient record. Has had a gout attack ; better at this time. Abdominal pain is \" way better\"    He states he has been feeling very well. He is due for colonoscopy as of 9/25/2019  History     Past Medical History:   Diagnosis Date    [facility]congestive [facility] failure) ([facility] 12/2013    [facility]    Hypertension     [facility]    [facility] HTN ([facility])     Sleep apnea       History reviewed. No pertinent surgical history. Current Outpatient Medications   Medication Sig Dispense Refill    carvedilol (COREG) 6.25 mg tablet Take 1 Tab by mouth two (2) times daily (with meals). 180 Tab 1    bumetanide (BUMEX) 1 mg tablet Take 1 Tab by mouth two (2) times a day. 180 Tab 0    sacubitril-valsartan (ENTRESTO) 24 mg/26 mg tablet Take 1 Tab by mouth two (2) times a day. 60 Tab 3    sildenafil, antihypertensive, (REVATIO) 20 mg tablet Take 20 mg by mouth three (3) times daily. No Known Allergies  No family history on file. Social History     Tobacco Use    Smoking status: Never Smoker    Smokeless tobacco: Never Used   Substance Use Topics    Alcohol use: Yes     Comment: Social drinker     Patient Active Problem List   Diagnosis Code    Hypertension I10    Sleep apnea G47.30    CHF (congestive heart failure) ([facility]) I50.9    PTSD (post-traumatic stress disorder) F43.10    Pul[facility] HTN ([facility] I27.20    Severe obesity ([facility] E66.01       Depression Risk Factor Screening:     3 most recent PHQ Screens 8/27/2019   Little interest or pleasure in doing things Not at all   Feeling down, depressed, irritable, or hopeless Not at all   Total Score PHQ 2 0     Alcohol Risk Factor Screening:    You do not drink alcohol or very rarely. Functional Ability and Level of Safety:     Hearing Loss  Hearing is good. Activities of Daily Living  The home contains: no safety equipment. Patient does total self care    Fall Risk  No flowsheet data found. Abuse Screen  Patient is not abused    Cognitive Screening   Evaluation of Cognitive Function:  Has your family/caregiver stated any concerns about your memory: no may forget on ocassion  Normal    Patient Care Team   Patient Care Team:  Edmar Galindo MD as PCP - General ([facility])  Elsie Nye NP as Ambulatory Care Navigator     PE:  Visit Vitals  /71 (BP 1 Location: Left arm, BP Patient Position: Sitting)   Pulse 74   Temp 98.6 °F (37 °C) (Oral)   Resp 18   Ht 5' 6\" (1.676 m)   Wt 219 lb (99.3 kg)   SpO2 98%   BMI 35.35 kg/m²     General appearance: alert, cooperative, no distress, appears stated age  Neck: supple, symmetrical, trachea midline, no adenopathy, thyroid: not enlarged, symmetric, no tenderness/mass/nodules, no carotid bruit and no JVD  Lungs: clear to auscultation bilaterally  Heart: regular rate and rhythm, S1, S2 normal, no murmur, click, rub or gallop  Extremities: extremities normal, atraumatic, no cyanosis or edema      Assessment/Plan   Education and counseling provided:  Are appropriate based on today's review and evaluation           Health Maintenance Due   Topic Date Due    Influenza Age 5 to Adult - pt declined 08/01/2019     Pt well   treatment plan as listed below  Orders Placed This Encounter    LIPID PANEL    METABOLIC PANEL, BASIC    REFERRAL TO [id number] [employer] [facility]  N as ordered  Follow-up and Dispositions    · Return in about 6 months (around 2/27/2020), or htn. An After Visit Summary was printed and given to the patient. This has been fully explained to the patient, who indicates understanding.

## 2019-08-27 NOTE — PATIENT INSTRUCTIONS
Well Visit, Ages 25 to 48: Care Instructions  Your Care Instructions    Physical exams can help you stay healthy. Your doctor has checked your overall health and may have suggested ways to take good care of yourself. He or she also may have recommended tests. At home, you can help prevent illness with healthy eating, regular exercise, and other steps. Follow-up care is a key part of your treatment and safety. Be sure to make and go to all appointments, and call your doctor if you are having problems. It's also a good idea to know your test results and keep a list of the medicines you take. How can you care for yourself at home? · Reach and stay at a healthy weight. This will lower your risk for many problems, such as obesity, diabetes, heart disease, and high blood pressure. · Get at least 30 minutes of physical activity on most days of the week. Walking is a good choice. You also may want to do other activities, such as running, swimming, cycling, or playing tennis or team sports. Discuss any changes in your exercise program with your doctor. · Do not smoke or allow others to smoke around you. If you need help quitting, talk to your doctor about stop-smoking programs and medicines. These can increase your chances of quitting for good. · Talk to your doctor about whether you have any risk factors for sexually transmitted infections (STIs). Having one sex partner (who does not have STIs and does not have sex with anyone else) is a good way to avoid these infections. · Use birth control if you do not want to have children at this time. Talk with your doctor about the choices available and what might be best for you. · Protect your skin from too much sun. When you're outdoors from 10 a.m. to 4 p.m., stay in the shade or cover up with clothing and a hat with a wide brim. Wear sunglasses that block UV rays. Even when it's cloudy, put broad-spectrum sunscreen (SPF 30 or higher) on any exposed skin.   · See a dentist one or two times a year for checkups and to have your teeth cleaned. · Wear a seat belt in the car. Follow your doctor's advice about when to have certain tests. These tests can spot problems early. For everyone  · Cholesterol. Have the fat (cholesterol) in your blood tested after age 21. Your doctor will tell you how often to have this done based on your age, family history, or other things that can increase your risk for heart disease. · Blood pressure. Have your blood pressure checked during a routine doctor visit. Your doctor will tell you how often to check your blood pressure based on your age, your blood pressure results, and other factors. · Vision. Talk with your doctor about how often to have a glaucoma test.  · Diabetes. Ask your doctor whether you should have tests for diabetes. · Colon cancer. Your risk for colorectal cancer gets higher as you get older. Some experts say that adults should start regular screening at age 48 and stop at age 76. Others say to start before age 48 or continue after age 76. Talk with your doctor about your risk and when to start and stop screening. For women  · Breast exam and mammogram. Talk to your doctor about when you should have a clinical breast exam and a mammogram. Medical experts differ on whether and how often women under 50 should have these tests. Your doctor can help you decide what is right for you. · Cervical cancer screening test and pelvic exam. Begin with a Pap test at age 24. The test often is part of a pelvic exam. Starting at age 27, you may choose to have a Pap test, an HPV test, or both tests at the same time (called co-testing). Talk with your doctor about how often to have testing. · Tests for sexually transmitted infections (STIs). Ask whether you should have tests for STIs. You may be at risk if you have sex with more than one person, especially if your partners do not wear condoms.   For men  · Tests for sexually transmitted infections (STIs). Ask whether you should have tests for STIs. You may be at risk if you have sex with more than one person, especially if you do not wear a condom. · Testicular cancer exam. Ask your doctor whether you should check your testicles regularly. · Prostate exam. Talk to your doctor about whether you should have a blood test (called a PSA test) for prostate cancer. Experts differ on whether and when men should have this test. Some experts suggest it if you are older than 39 and are -American or have a father or brother who got prostate cancer when he was younger than 72. When should you call for help? Watch closely for changes in your health, and be sure to contact your doctor if you have any problems or symptoms that concern you. Where can you learn more? Go to http://emiliano-madeleine.info/. Enter P072 in the search box to learn more about \"Well Visit, Ages 25 to 48: Care Instructions. \"  Current as of: December 13, 2018  Content Version: 12.1  © 8109-1997 Healthwise, Incorporated. Care instructions adapted under license by [employer] (which disclaims liability or warranty for this information). If you have questions about a medical condition or this instruction, always ask your healthcare professional. [place] [address] any warranty or liability for your use of this information. Medicare Wellness Visit, Male    The best way to live healthy is to have a lifestyle where you eat a well-balanced diet, exercise regularly, limit alcohol use, and quit all forms of tobacco/nicotine, if applicable. Regular preventive services are another way to keep healthy. Preventive services (vaccines, screening tests, monitoring & exams) can help personalize your care plan, which helps you manage your own care. Screening tests can find health problems at the earliest stages, when they are easiest to treat.    [facility] follows the current, evidence-based guidelines published by the [place] (USPSTF) when recommending preventive services for our patients. Because we follow these guidelines, sometimes recommendations change over time as research supports it. (For example, a prostate screening blood test is no longer routinely recommended for men with no symptoms.)  Of course, you and your doctor may decide to screen more often for some diseases, based on your risk and co-morbidities (chronic disease you are already diagnosed with). Preventive services for you include:  - Medicare offers their members a free annual wellness visit, which is time for you and your primary care provider to discuss and plan for your preventive service needs. Take advantage of this benefit every year!  -All adults over age 72 should receive the recommended pneumonia vaccines. Current USPSTF guidelines recommend a series of two vaccines for the best pneumonia protection.   -All adults should have a flu vaccine yearly and an ECG. All adults age 61 and older should receive a shingles vaccine once in their lifetime.    -All adults age 38-68 who are overweight should have a diabetes screening test once every three years.   -Other screening tests & preventive services for persons with diabetes include: an eye exam to screen for diabetic retinopathy, a kidney function test, a foot exam, and stricter control over your cholesterol.   -Cardiovascular screening for adults with routine risk involves an electrocardiogram (ECG) at intervals determined by the provider.   -Colorectal cancer screening should be done for adults age 54-65 with no increased risk factors for colorectal cancer. There are a number of acceptable methods of screening for this type of cancer. Each test has its own benefits and drawbacks. Discuss with your provider what is most appropriate for you during your annual wellness visit.  The different tests include: colonoscopy (considered the best screening method), a fecal occult blood test, a fecal DNA test, and sigmoidoscopy.  -All adults born between [facility] should be screened once for Hepatitis C.  -An Abdominal Aortic Aneurysm (AAA) Screening is recommended for men age 73-68 who has ever smoked in their lifetime.      Here is a list of your current Health Maintenance items (your personalized list of preventive services) with a due date:  Health Maintenance Due   Topic Date Due    Flu Vaccine  08/01/2019

## 2019-08-27 NOTE — PROGRESS NOTES
Patient here for toes and abdominal and well male visit. 1. Have you been to the ER, urgent care clinic since your last visit? Hospitalized since your last visit? [facility] [place] post procedure    2. Have you seen or consulted any other health care providers outside of the [address] [place] [address] since your last visit? Include any pap smears or colon screening.  Dr Rudi Alonso Electrophysiologist

## 2019-08-27 NOTE — ACP (ADVANCE CARE PLANNING)
Advance Care Planning (ACP) Provider Conversation Snapshot    Date of ACP Conversation: 08/27/19  Persons included in Conversation:  patient  Length of ACP Conversation in minutes:  <16 minutes (Non-Billable)    Authorized Decision Maker (if patient is incapable of making informed decisions):    This person is:   NA            For Patients with Decision Making Capacity:   TBD    Conversation Outcomes / Follow-Up Plan:   Recommended completion of Advance Directive form after review of ACP materials and conversation with prospective healthcare agent   Recommended communicating the plan and making copies for the healthcare agent, personal physician, and others as appropriate (e.g., health system)

## 2020-01-01 NOTE — PATIENT INSTRUCTIONS
Foot Pain: Care Instructions  Your Care Instructions  Foot injuries that cause pain and swelling are fairly common. Almost all sports or home repair projects can cause a misstep that ends up as foot pain. Normal wear and tear, especially as you get older, also can cause foot pain. Most minor foot injuries will heal on their own, and home treatment is usually all you need to do. If you have a severe injury, you may need tests and treatment. Follow-up care is a key part of your treatment and safety. Be sure to make and go to all appointments, and call your doctor if you are having problems. It's also a good idea to know your test results and keep a list of the medicines you take. How can you care for yourself at home? · Take pain medicines exactly as directed. ? If the doctor gave you a prescription medicine for pain, take it as prescribed. ? If you are not taking a prescription pain medicine, ask your doctor if you can take an over-the-counter medicine. · Rest and protect your foot. Take a break from any activity that may cause pain. · Put ice or a cold pack on your foot for 10 to 20 minutes at a time. Put a thin cloth between the ice and your skin. · Prop up the sore foot on a pillow when you ice it or anytime you sit or lie down during the next 3 days. Try to keep it above the level of your heart. This will help reduce swelling. · Your doctor may recommend that you wrap your foot with an elastic bandage. Keep your foot wrapped for as long as your doctor advises. · If your doctor recommends crutches, use them as directed. · Wear roomy footwear. · As soon as pain and swelling end, begin gentle exercises of your foot. Your doctor can tell you which exercises will help. When should you call for help? Call 911 anytime you think you may need emergency care.  For example, call if:    · Your foot turns pale, white, blue, or cold.    Call your doctor now or seek immediate medical care if:    · You cannot move or stand on your foot.     · Your foot looks twisted or out of its normal position.     · Your foot is not stable when you step down.     · You have signs of infection, such as:  ? Increased pain, swelling, warmth, or redness. ? Red streaks leading from the sore area. ? Pus draining from a place on your foot. ? A fever.     · Your foot is numb or tingly.    Watch closely for changes in your health, and be sure to contact your doctor if:    · You do not get better as expected.     · You have bruises from an injury that last longer than 2 weeks. Where can you learn more? Go to http://emiliano-madeleine.info/. Enter T082 in the search box to learn more about \"Foot Pain: Care Instructions. \"  Current as of: November 29, 2017  Content Version: 11.8  © 6931-9120 [employer]. Care instructions adapted under license by [employer] (which disclaims liability or warranty for this information). If you have questions about a medical condition or this instruction, always ask your healthcare professional. [address] any warranty or liability for your use of this information. Purine-Restricted Diet: Care Instructions  Your Care Instructions    Purines are substances that are found in some foods. Your body turns purines into uric acid. High levels of uric acid can cause gout, which is a form of arthritis that causes pain and inflammation in joints. You may be able to help control the amount of uric acid in your body by limiting high-purine foods in your diet. Follow-up care is a key part of your treatment and safety. Be sure to make and go to all appointments, and call your doctor if you are having problems. It's also a good idea to know your test results and keep a list of the medicines you take. How can you care for yourself at home? · Plan your meals and snacks around foods that are low in purines and are safe for you to eat.  These foods include:  ? Green vegetables and tomatoes. ? Fruits. ? Whole-grain breads, rice, and cereals. ? Eggs, peanut butter, and nuts. ? Low-fat milk, cheese, and other milk products. ? Popcorn. ? Gelatin desserts, chocolate, cocoa, and cakes and sweets, in small amounts. · You can eat certain foods that are medium-high in purines, but eat them only once in a while. These foods include:  ? Legumes, such as dried beans and dried peas. You can have 1 cup cooked legumes each day. ? Asparagus, cauliflower, spinach, mushrooms, and green peas. ? Fish and seafood (other than very high-purine seafood). ? Oatmeal, wheat bran, and wheat germ. · Limit very high-purine foods, including:  ? Organ meats, such as liver, kidneys, sweetbreads, and brains. ? Meats, including arango, beef, pork, and lamb. ? Game meats and any other meats in large amounts. ? Anchovies, sardines, herring, mackerel, and scallops. ? Gravy. ? Beer. Where can you learn more? Go to http://emiliano-madeleine.info/. Enter F448 in the search box to learn more about \"Purine-Restricted Diet: Care Instructions. \"  Current as of: March 29, 2018  Content Version: 11.8  © 3751-0899 [employer]. Care instructions adapted under license by [employer] (which disclaims liability or warranty for this information). If you have questions about a medical condition or this instruction, always ask your healthcare professional. [place] [address] any warranty or liability for your use of this information. Statement Selected

## 2020-11-06 RX ORDER — PANTOPRAZOLE SODIUM 40 MG/1
TABLET, DELAYED RELEASE ORAL
Qty: 30 TAB | Refills: 5 | OUTPATIENT
Start: 2020-11-06

## 2021-02-18 ENCOUNTER — HOSPITAL ENCOUNTER (EMERGENCY)
Age: 51
Discharge: HOME | End: 2021-02-18
Attending: EMERGENCY MEDICINE
Payer: MEDICARE

## 2021-02-18 VITALS
OXYGEN SATURATION: 100 % | SYSTOLIC BLOOD PRESSURE: 133 MMHG | WEIGHT: 230 LBS | RESPIRATION RATE: 23 BRPM | HEART RATE: 82 BPM | BODY MASS INDEX: 36.96 KG/M2 | TEMPERATURE: 98.5 F | HEIGHT: 66 IN | DIASTOLIC BLOOD PRESSURE: 89 MMHG

## 2021-02-18 DIAGNOSIS — Z45.02 DEFIBRILLATOR DISCHARGE: Primary | ICD-10-CM

## 2021-02-18 LAB
ANION GAP SERPL CALC-SCNC: 4 MMOL/L (ref 3–18)
ATRIAL RATE: 84 BPM
BASOPHILS # BLD: 0 K/UL (ref 0–0.1)
BASOPHILS NFR BLD: 1 % (ref 0–2)
BUN SERPL-MCNC: 19 MG/DL (ref 7–18)
BUN/CREAT SERPL: 14 (ref 12–20)
CALCIUM SERPL-MCNC: 8.9 MG/DL (ref 8.5–10.1)
CALCULATED P AXIS, ECG09: 56 DEGREES
CALCULATED R AXIS, ECG10: 101 DEGREES
CALCULATED T AXIS, ECG11: 29 DEGREES
CHLORIDE SERPL-SCNC: 104 MMOL/L (ref 100–111)
CK MB CFR SERPL CALC: 1.2 % (ref 0–4)
CK MB SERPL-MCNC: 1.5 NG/ML (ref 5–25)
CK SERPL-CCNC: 127 U/L (ref 39–308)
CO2 SERPL-SCNC: 33 MMOL/L (ref 21–32)
CREAT SERPL-MCNC: 1.39 MG/DL (ref 0.6–1.3)
DIAGNOSIS, 93000: NORMAL
DIFFERENTIAL METHOD BLD: ABNORMAL
EOSINOPHIL # BLD: 0.9 K/UL (ref 0–0.4)
EOSINOPHIL NFR BLD: 17 % (ref 0–5)
ERYTHROCYTE [DISTWIDTH] IN BLOOD BY AUTOMATED COUNT: 13.7 % (ref 11.6–14.5)
GLUCOSE SERPL-MCNC: 90 MG/DL (ref 74–99)
HCT VFR BLD AUTO: 45.1 % (ref 36–48)
HGB BLD-MCNC: 14.7 G/DL (ref 13–16)
LYMPHOCYTES # BLD: 2.3 K/UL (ref 0.9–3.6)
LYMPHOCYTES NFR BLD: 43 % (ref 21–52)
MAGNESIUM SERPL-MCNC: 2.4 MG/DL (ref 1.6–2.6)
MCH RBC QN AUTO: 30.2 PG (ref 24–34)
MCHC RBC AUTO-ENTMCNC: 32.6 G/DL (ref 31–37)
MCV RBC AUTO: 92.6 FL (ref 74–97)
MONOCYTES # BLD: 0.5 K/UL (ref 0.05–1.2)
MONOCYTES NFR BLD: 11 % (ref 3–10)
NEUTS SEG # BLD: 1.4 K/UL (ref 1.8–8)
NEUTS SEG NFR BLD: 28 % (ref 40–73)
P-R INTERVAL, ECG05: 144 MS
PLATELET # BLD AUTO: 205 K/UL (ref 135–420)
PMV BLD AUTO: 10.3 FL (ref 9.2–11.8)
POTASSIUM SERPL-SCNC: 4.1 MMOL/L (ref 3.5–5.5)
Q-T INTERVAL, ECG07: 444 MS
QRS DURATION, ECG06: 170 MS
QTC CALCULATION (BEZET), ECG08: 524 MS
RBC # BLD AUTO: 4.87 M/UL (ref 4.7–5.5)
SODIUM SERPL-SCNC: 141 MMOL/L (ref 136–145)
TROPONIN I SERPL-MCNC: 0.67 NG/ML (ref 0–0.04)
TROPONIN I SERPL-MCNC: 0.76 NG/ML (ref 0–0.04)
VENTRICULAR RATE, ECG03: 84 BPM
WBC # BLD AUTO: 5.1 K/UL (ref 4.6–13.2)

## 2021-02-18 PROCEDURE — 82553 CREATINE MB FRACTION: CPT

## 2021-02-18 PROCEDURE — 83735 ASSAY OF MAGNESIUM: CPT

## 2021-02-18 PROCEDURE — 80048 BASIC METABOLIC PNL TOTAL CA: CPT

## 2021-02-18 PROCEDURE — 93005 ELECTROCARDIOGRAM TRACING: CPT

## 2021-02-18 PROCEDURE — 85025 COMPLETE CBC W/AUTO DIFF WBC: CPT

## 2021-02-18 PROCEDURE — 99285 EMERGENCY DEPT VISIT HI MDM: CPT

## 2021-02-18 NOTE — ED PROVIDER NOTES
EMERGENCY DEPARTMENT HISTORY AND PHYSICAL EXAM    9:39 AM      Date: 2/18/2021  Patient Name: Tiffani Quinonez    History of Presenting Illness     Chief Complaint   Patient presents with    AICD problem         History Provided By: Patient    Additional History (Context): Tiffani Quinonez is a 45 y.o. male with Past medical history of pulmonary hypertension, cardiomegaly, hypertension, CHF, defibrillator, PTSD who presents with chief complaint of his cardiologist office called this morning stating that his defibrillator went off on yesterday. Patient states that he did not feel any sensation of the defibrillator going off at any time yesterday or recently. States he was hunting rabbits on yesterday at the time that he states the defibrillator went off. He denies any chest pain, palpitations, dizziness, weakness, numbness, abdominal pain, shortness of breath, and no fever, lightheadedness, no other complaints. Patient states that he had a defibrillator placed in [place] and that is where his cardiologist is. Does report that he has another cardiologist that he sees in [place]. PCP: Lakia Almazan MD        Past History     Past Medical History:  Past Medical History:   Diagnosis Date    Cardiomegaly     CHF [facility]congestive [facility] failure) [facility] [address] [address] [facility]    Hypertension     [facility] ([facility] stress disorder)     [facility]    Pulmonary HTN ([facility] [address] [facility]     Sleep apnea        Past Surgical History:  Past Surgical History:   Procedure Laterality Date    HX PACEMAKER      defibrillator 2018 in [place]       Family History:  History reviewed. No pertinent family history. Social History:  Social History     Tobacco Use    Smoking status: Never Smoker    Smokeless tobacco: Never Used   Substance Use Topics    Alcohol use: Yes     Comment: Social drinker    Drug use: No       Allergies:  Not on File      Review of Systems       Review of Systems   Constitutional: Negative for chills and fever.    HENT: Negative for congestion, rhinorrhea, sore throat and trouble swallowing. Eyes: Negative for visual disturbance. Respiratory: Negative for cough, chest tightness and shortness of breath. Cardiovascular: Negative for chest pain and palpitations. Gastrointestinal: Negative for abdominal pain, nausea and vomiting. Genitourinary: Negative for flank pain. Musculoskeletal: Negative for arthralgias. Skin: Negative for rash. Neurological: Negative for dizziness, weakness, numbness and headaches. Hematological: Does not bruise/bleed easily. Psychiatric/Behavioral: Negative for confusion and dysphoric mood. All other systems reviewed and are negative. Physical Exam     Visit Vitals  /89   Pulse 82   Temp 98.5 °F (36.9 °C)   Resp 23   Ht 5' 6\" (1.676 m)   Wt 104.3 kg (230 lb)   SpO2 100%   BMI 37.12 kg/m²         Physical Exam  Vitals signs and nursing note reviewed. Constitutional:       General: He is not in acute distress. Appearance: He is well-developed. He is not ill-appearing or diaphoretic. HENT:      Head: Normocephalic and atraumatic. Eyes:      General: No scleral icterus. Conjunctiva/sclera: Conjunctivae normal.      Pupils: Pupils are equal, round, and reactive to light. Neck:      Musculoskeletal: Normal range of motion and neck supple. Cardiovascular:      Rate and Rhythm: Normal rate and regular rhythm. Pulses: Normal pulses. Heart sounds: Normal heart sounds. Pulmonary:      Effort: Pulmonary effort is normal. No respiratory distress. Breath sounds: Normal breath sounds. No wheezing, rhonchi or rales. Abdominal:      General: Bowel sounds are normal. There is no distension. Palpations: Abdomen is soft. There is no mass. Tenderness: There is no abdominal tenderness. Musculoskeletal: Normal range of motion. Right lower leg: No edema. Left lower leg: No edema. Skin:     General: Skin is warm and dry.       Capillary Refill: Capillary refill takes less than 2 seconds. Neurological:      General: No focal deficit present. Mental Status: He is alert and oriented to person, place, and time. Cranial Nerves: No cranial nerve deficit. Motor: No weakness. Coordination: Coordination normal.   Psychiatric:         Mood and Affect: Mood normal.         Thought Content: Thought content normal.           Diagnostic Study Results     Labs -  No results found for this or any previous visit (from the past 12 hour(s)). Radiologic Studies -   No orders to display         Medical Decision Making   I am the first provider for this patient. I reviewed the vital signs, available nursing notes, past medical history, past surgical history, family history and social history. Vital Signs-Reviewed the patient's vital signs. Pulse Oximetry Analysis -  100% on room air (Interpretation) normal    Cardiac Monitor:  Rate: 81  Rhythm:  Normal Sinus Rhythm     EKG: Interpreted by the EP Dr. Gilberto Cruz. Time Interpreted: 10:01 AM   Rate: 84   Rhythm: Normal Sinus Rhythm   Interpretation: Prolonged QTC, appears to be left bundle branch block, no ST elevations, no STEMI   Comparison: Old EKG with left bundle branch block on April 30, 2017, no significant change in EKG    Records Reviewed: Nursing Notes and Old Medical Records (Time of Review: 9:39 AM)    Provider Notes (Medical Decision Making): DDx: Defibrillator firing, metabolic    We will check labs, EKG, placed on monitor      MDM    Medications - No data to display        ED Course: Progress Notes, Reevaluation, and Consults:  10:30 AM  Called [facility] ([facility] where defibrillator was placed. The tech states that patient had 32 second run of V. tach. 2 shocks occurred 1 at 39 J and another at 40 J. Patient never felt these shocks. Pt states he already has cardiology appt set up in 2 weeks. Initial trop elevated. Likely from discharge of defib.  Will repeat trop. Reassessed pt and resting. No cp, sob, no complaint. Creatine 1.39, down from baseline. Repeat trop decreased from 0.76 to 0.67. Consult:  Discussed care with PATRICIA De La Garza, Specialty: On call for cardiology,  Standard discussion; including history of patients chief complaint, available diagnostic results, and treatment course. Agrees pt can be discharged home with f/u his cardiologist.     I have reassessed the patient. I have discussed the workup, results and plan with the patient and patient is in agreement. Patient has no complaint. Patient was discharge in stable condition. Patient was given outpatient follow up. Patient is to return to emergency department if any new or worsening condition. Diagnosis     Clinical Impression:   1. Defibrillator discharge        Disposition: discharged    Follow-up Information     Follow up With Specialties Details Why Contact Info    Your cardiologist   Keep your appointment this month without fail     [facility] EMERGENC[facility] DEPT Emergency Medicine  As needed, If symptoms worsen [address] [facility]  [phone]           Discharge Medication List as of 2/18/2021  3:22 PM      CONTINUE these medications which have NOT CHANGED    Details   carvedilol (COREG) 6.25 mg tablet Take 1 Tab by mouth two (2) times daily (with meals). , Normal, Disp-180 Tab, R-1      bumetanide (BUMEX) 1 mg tablet Take 1 Tab by mouth two (2) times a day., Normal, Disp-180 Tab, R-0      sacubitril-valsartan (ENTRESTO) 24 mg/26 mg tablet Take 1 Tab by mouth two (2) times a day., No Print, Disp-60 Tab, R-3      sildenafil, antihypertensive, (REVATIO) 20 mg tablet Take 20 mg by mouth three (3) times daily. , Historical Med               Jun Sanchez DO    Dragon medical dictation software was used for portions of this report. Unintended transcription errors may occur.      My signature above authenticates this document and my orders, the final    diagnosis (es), discharge prescription (s), and instructions in the Epic    record.

## 2021-02-18 NOTE — ED NOTES
[facility]'s report of defibrillation firing yesterday. Per representative, pt had a 32 second run of 157 [place]. States defibrillator fired at 36 joules, which did not resolve arrhythmia so a second defrillation at 40 joules was delivered and successful. Dr. Flower Session made aware.

## 2021-02-18 NOTE — ED TRIAGE NOTES
Patient cardiologist Dr. Santo Amin called him this morning and told him to come in to be check because his defibrillator went off yesterday about 10:42. Patient was rabbit hunting yesterday.

## 2021-02-18 NOTE — ED NOTES
Pt updated that will have blood drawn at 1330 for comparison. Pt verbalized understanding. Pt in NAD, denies any pain or concerns at this time.

## 2021-05-13 NOTE — DISCHARGE INSTRUCTIONS
Heart Failure: Care Instructions  Your Care Instructions    Heart failure occurs when your heart does not pump as much blood as the body needs. Failure does not mean that the heart has stopped pumping but rather that it is not pumping as well as it should. Over time, this causes fluid buildup in your lungs and other parts of your body. Fluid buildup can cause shortness of breath, fatigue, swollen ankles, and other problems. By taking medicines regularly, reducing sodium (salt) in your diet, checking your weight every day, and making lifestyle changes, you can feel better and live longer. Follow-up care is a key part of your treatment and safety. Be sure to make and go to all appointments, and call your doctor if you are having problems. It's also a good idea to know your test results and keep a list of the medicines you take. How can you care for yourself at home? Medicines  · Be safe with medicines. Take your medicines exactly as prescribed. Call your doctor if you think you are having a problem with your medicine. · Do not take any vitamins, over-the-counter medicine, or herbal products without talking to your doctor first. Ethelle Nian not take ibuprofen (Advil or Motrin) and naproxen (Aleve) without talking to your doctor first. They could make your heart failure worse. · You may be taking some of the following medicine. ¨ Beta-blockers can slow heart rate, decrease blood pressure, and improve your condition. Taking a beta-blocker may lower your chance of needing to be hospitalized. ¨ Angiotensin-converting enzyme inhibitors (ACEIs) reduce the heart's workload, lower blood pressure, and reduce swelling. Taking an ACEI may lower your chance of needing to be hospitalized again. ¨ Angiotensin II receptor blockers (ARBs) work like ACEIs. Your doctor may prescribe them instead of ACEIs. ¨ Diuretics, also called water pills, reduce swelling.   ¨ Potassium supplements replace this important mineral, which is sometimes lost with diuretics. ¨ Aspirin and other blood thinners prevent blood clots, which can cause a stroke or heart attack. You will get more details on the specific medicines your doctor prescribes. Diet  · Your doctor may suggest that you limit sodium to 2,000 milligrams (mg) a day or less. That is less than 1 teaspoon of salt a day, including all the salt you eat in cooking or in packaged foods. People get most of their sodium from processed foods. Fast food and restaurant meals also tend to be very high in sodium. · Ask your doctor how much liquid you can drink each day. You may have to limit liquids. Weight  · Weigh yourself without clothing at the same time each day. Record your weight. Call your doctor if you gain more than 3 pounds in 2 to 3 days. A sudden weight gain may mean that your heart failure is getting worse. Activity level  · Start light exercise (if your doctor says it is okay). Even if you can only do a small amount, exercise will help you get stronger, have more energy, and manage your weight and your stress. Walking is an easy way to get exercise. Start out by walking a little more than you did before. Bit by bit, increase the amount you walk. · When you exercise, watch for signs that your heart is working too hard. You are pushing yourself too hard if you cannot talk while you are exercising. If you become short of breath or dizzy or have chest pain, stop, sit down, and rest.  · If you feel \"wiped out\" the day after you exercise, walk slower or for a shorter distance until you can work up to a better pace. · Get enough rest at night. Sleeping with 1 or 2 pillows under your upper body and head may help you breathe easier. Lifestyle changes  · Do not smoke. Smoking can make a heart condition worse. If you need help quitting, talk to your doctor about stop-smoking programs and medicines. These can increase your chances of quitting for good.  Quitting smoking may be the most important step you can take to protect your heart. · Limit alcohol to 2 drinks a day for men and 1 drink a day for women. Too much alcohol can cause health problems. · Avoid getting sick from colds and the flu. Get a pneumococcal vaccine shot. If you have had one before, ask your doctor whether you need another dose. Get a flu shot each year. If you must be around people with colds or the flu, wash your hands often. When should you call for help? Call 911 if you have symptoms of sudden heart failure such as:  · You have severe trouble breathing. · You cough up pink, foamy mucus. · You have a new irregular or rapid heartbeat. Call your doctor now or seek immediate medical care if:  · You have new or increased shortness of breath. · You are dizzy or lightheaded, or you feel like you may faint. · You have sudden weight gain, such as 3 pounds or more in 2 to 3 days. · You have increased swelling in your legs, ankles, or feet. · You are suddenly so tired or weak that you cannot do your usual activities. Watch closely for changes in your health, and be sure to contact your doctor if:  · You develop new symptoms. Where can you learn more? Go to http://emiliano-madeleine.info/. Enter C986 in the search box to learn more about \"Heart Failure: Care Instructions. \"  Current as of: January 27, 2016  Content Version: 11.1  © 7966-9714 [employer]. Care instructions adapted under license by [employer] (which disclaims liability or warranty for this information). If you have questions about a medical condition or this instruction, always ask your healthcare professional. [place] [address] any warranty or liability for your use of this information. no

## 2021-12-23 NOTE — MR AVS SNAPSHOT
23-Dec-2021 13:47 [address] [facility] 
 
 
 [address] [place] [address] [address] [address] 
[phone] Patient: Oracio Parmar MRN: [id number] [id number] Visit Information Date & Time Provider Department Dept. Phone Encounter #  
 10/16/2018 12:20 PM Quinton Martinez NP Wagner Izquierdo [address] [id number] Follow-up Instructions Return in about 3 days (around 10/19/2018) for follow up weight check/CHF. Upcoming Health Maintenance Date Due  
 MEDICARE YEARLY EXAM 3/14/2018 Influenza Age 5 to Adult 8/1/2018 DTaP/Tdap/Td series (2 - Td) 5/23/2027 Allergies as of 10/16/2018  Review Complete On: 10/16/2018 By: Quinton Martinez NP No Known Allergies Current Immunizations  Reviewed on 12/12/2017 No immunizations on file. Not reviewed this visit You Were Diagnosed With   
  
 Codes Comments Congestive heart failure, unspecified HF chronicity, unspecified heart failure type ([facility] [address]    -  Primary ICD-10-CM: I50.9 ICD-9-CM: 428.0 Pulmonary HTN ([facility] [address]     ICD-10-CM: I27.20 ICD-9-CM: 416.8 Weight gain     ICD-10-CM: R63.5 ICD-9-CM: 783.1 Vitals BP Pulse Temp Resp Height(growth percentile) Weight(growth percentile) 130/72 (BP 1 Location: Left arm, BP Patient Position: Sitting) 88 97.9 °F (36.6 °C) (Oral) 22 5' 6\" (1.676 m) 231 lb 6.4 oz (105 kg) SpO2 BMI Smoking Status 99% 37.35 kg/m2 Never Smoker Vitals History BMI and BSA Data Body Mass Index Body Surface Area  
 37.35 kg/m 2 2.21 m 2 Preferred Pharmacy Pharmacy Name Phone [facility] [address], [facility] [phone] Your Updated Medication List  
  
   
This list is accurate as of 10/16/18 12:57 PM.  Always use your most recent med list.  
  
  
  
  
 bumetanide 1 mg tablet Commonly known as:  Irene Minal Take 1 Tab by mouth daily. carvedilol 6.25 mg tablet Commonly known as:  Yong Griffes Take  by mouth two (2) times daily (with meals). famotidine 20 mg tablet Commonly known as:  PEPCID Take 20 mg by mouth two (2) times daily as needed. omeprazole 20 mg capsule Commonly known as:  PRILOSEC Take 1 Cap by mouth daily. potassium chloride SR 20 mEq tablet Commonly known as:  K-TAB 20 mEq. REVATIO 20 mg tablet Generic drug:  sildenafil (antihypertensive) Take 20 mg by mouth three (3) times daily. sacubitril-valsartan 24-26 mg tablet Commonly known as:  ENTRESTO Take 1 Tab by mouth two (2) times a day. Follow-up Instructions Return in about 3 days (around 10/19/2018) for follow up weight check/CHF. Patient Instructions Gastroesophageal Reflux Disease (GERD): Care Instructions Your Care Instructions Gastroesophageal reflux disease (GERD) is the backward flow of stomach acid into the esophagus. The esophagus is the tube that leads from your throat to your stomach. A one-way valve prevents the stomach acid from moving up into this tube. When you have GERD, this valve does not close tightly enough. If you have mild GERD symptoms including heartburn, you may be able to control the problem with antacids or over-the-counter medicine. Changing your diet, losing weight, and making other lifestyle changes can also help reduce symptoms. Follow-up care is a key part of your treatment and safety. Be sure to make and go to all appointments, and call your doctor if you are having problems. It's also a good idea to know your test results and keep a list of the medicines you take. How can you care for yourself at home? · Take your medicines exactly as prescribed. Call your doctor if you think you are having a problem with your medicine. · Your doctor may recommend over-the-counter medicine.  For mild or occasional indigestion, antacids, such as Tums, Gaviscon, Mylanta, or Maalox, may help. Your doctor also may recommend over-the-counter acid reducers, such as Pepcid AC, Tagamet HB, Zantac 75, or Prilosec. Read and follow all instructions on the label. If you use these medicines often, talk with your doctor. · Change your eating habits. ¨ It's best to eat several small meals instead of two or three large meals. ¨ After you eat, wait 2 to 3 hours before you lie down. ¨ Chocolate, mint, and alcohol can make GERD worse. ¨ Spicy foods, foods that have a lot of acid (like tomatoes and oranges), and coffee can make GERD symptoms worse in some people. If your symptoms are worse after you eat a certain food, you may want to stop eating that food to see if your symptoms get better. · Do not smoke or chew tobacco. Smoking can make GERD worse. If you need help quitting, talk to your doctor about stop-smoking programs and medicines. These can increase your chances of quitting for good. · If you have GERD symptoms at night, raise the head of your bed 6 to 8 inches by putting the frame on blocks or placing a foam wedge under the head of your mattress. (Adding extra pillows does not work.) · Do not wear tight clothing around your middle. · Lose weight if you need to. Losing just 5 to 10 pounds can help. When should you call for help? Call your doctor now or seek immediate medical care if: 
  · You have new or different belly pain.  
  · Your stools are black and tarlike or have streaks of blood.  
 Watch closely for changes in your health, and be sure to contact your doctor if: 
  · Your symptoms have not improved after 2 days.  
  · Food seems to catch in your throat or chest.  
Where can you learn more? Go to http://emiliano-madeleine.info/. Enter G403 in the search box to learn more about \"Gastroesophageal Reflux Disease (GERD): Care Instructions. \" Current as of: March 28, 2018 Content Version: 11.8 © 1092-0357 [employer]. Care instructions adapted under license by [employer] (which disclaims liability or warranty for this information). If you have questions about a medical condition or this instruction, always ask your healthcare professional. [address] any warranty or liability for your use of this information. Introducing [facility]! [facility] introduces GripeO patient portal. Now you can access parts of your medical record, email your doctor's office, and request medication refills online. 1. In your internet browser, go to [email or web address] 2. Click on the First Time User? Click Here link in the Sign In box. You will see the New Member Sign Up page. 3. Enter your GripeO Access Code exactly as it appears below. You will not need to use this code after youve completed the sign-up process. If you do not sign up before the expiration date, you must request a new code. · GripeO Access Code: [email or web address] Expires: 10/27/2018  5:19 AM 
 
4. Enter the last four digits of your Social Security Number (xxxx) and Date of Birth (mm/dd/yyyy) as indicated and click Submit. You will be taken to the next sign-up page. 5. Create a GripeO ID. This will be your GripeO login ID and cannot be changed, so think of one that is secure and easy to remember. 6. Create a GripeO password. You can change your password at any time. 7. Enter your Password Reset Question and Answer. This can be used at a later time if you forget your password. 8. Enter your e-mail address. You will receive e-mail notification when new information is available in [address] 9. Click Sign Up. You can now view and download portions of your medical record. 10. Click the Download Summary menu link to download a portable copy of your medical information.  
 
If you have questions, please visit the Frequently Asked Questions section of the [place] [place]. Remember, [employer] is NOT to be used for urgent needs. For medical emergencies, dial 911. Now available from your iPhone and Android! Please provide this summary of care documentation to your next provider. Your primary care clinician is listed as [address]. If you have any questions after today's visit, please call [phone].

## 2022-03-20 PROBLEM — E66.01 SEVERE OBESITY (HCC): Status: ACTIVE | Noted: 2018-10-16

## 2024-08-19 ENCOUNTER — HOSPITAL ENCOUNTER (EMERGENCY)
Facility: HOSPITAL | Age: 54
Discharge: HOME | End: 2024-08-19
Attending: EMERGENCY MEDICINE
Payer: COMMERCIAL

## 2024-08-19 ENCOUNTER — APPOINTMENT (OUTPATIENT)
Facility: HOSPITAL | Age: 54
End: 2024-08-19
Payer: COMMERCIAL

## 2024-08-19 VITALS
RESPIRATION RATE: 16 BRPM | HEIGHT: 66 IN | DIASTOLIC BLOOD PRESSURE: 86 MMHG | OXYGEN SATURATION: 100 % | SYSTOLIC BLOOD PRESSURE: 123 MMHG | BODY MASS INDEX: 34.39 KG/M2 | WEIGHT: 214 LBS | TEMPERATURE: 97.8 F | HEART RATE: 76 BPM

## 2024-08-19 DIAGNOSIS — M79.672 LEFT FOOT PAIN: Primary | ICD-10-CM

## 2024-08-19 PROCEDURE — 99283 EMERGENCY DEPT VISIT LOW MDM: CPT

## 2024-08-19 PROCEDURE — 73630 X-RAY EXAM OF FOOT: CPT

## 2024-08-19 ASSESSMENT — PAIN SCALES - GENERAL: PAINLEVEL_OUTOF10: 7

## 2024-08-19 ASSESSMENT — PAIN - FUNCTIONAL ASSESSMENT: PAIN_FUNCTIONAL_ASSESSMENT: 0-10

## 2024-08-19 NOTE — ED PROVIDER NOTES
EMERGENCY DEPARTMENT HISTORY AND PHYSICAL EXAM    9:05 AM EDT seen at this time and QA        Date: 8/19/2024  Patient Name: Marino Mcneil    History of Presenting Illness     Chief Complaint   Patient presents with    Foot Pain         History Provided By: patient    Additional History (Context): Marino Mcneil is a 53 y.o. male presents with history of gout has 3 weeks of discomfort in the left first MP joint it is better after applying ice.  He may have stepped on it wrong and injured something but is not sure.  Low-grade discomfort at the time of my exam.    PCP: Deena David MD    Chief Complaint:   Duration:    Timing:    Location:   Quality:   Severity:   Modifying Factors:   Associated Symptoms:       No current facility-administered medications for this encounter.     Current Outpatient Medications   Medication Sig Dispense Refill    bumetanide (BUMEX) 1 MG tablet Take 1 mg by mouth 2 times daily      carvedilol (COREG) 6.25 MG tablet Take 6.25 mg by mouth 2 times daily (with meals)      sacubitril-valsartan (ENTRESTO) 24-26 MG per tablet Take 1 tablet by mouth 2 times daily      sildenafil (REVATIO) 20 MG tablet Take 20 mg by mouth 3 times daily         Past History     Past Medical History:  Past Medical History:   Diagnosis Date    Cardiomegaly     CHF (congestive heart failure) (HCC) 12/2013    [facility]    Hypertension     [facility] ([facility]    Pulmonary HTN (H[facility])     Sleep apnea        Past Surgical History:  Past Surgical History:   Procedure Laterality Date    PACEMAKER      defibrillator 2018 in [place]       Family History:  No family history on file.    Social History:  Social History     Tobacco Use    Smoking status: Never    Smokeless tobacco: Never   Substance Use Topics    Alcohol use: Yes    Drug use: No       Allergies:  No Known Allergies      Review of Systems     Review of Systems      Physical Exam       Patient Vitals for the past 12 hrs:   Temp Pulse

## 2024-08-19 NOTE — ED TRIAGE NOTES
Ambulatory to  with steady gait, states twisted left foot 3 weeks ago and has been having intermittent pain to top of foot near toes since, Hx of Gout.

## 2024-09-11 ENCOUNTER — OFFICE VISIT (OUTPATIENT)
Facility: CLINIC | Age: 54
End: 2024-09-11
Payer: MEDICARE

## 2024-09-11 VITALS
OXYGEN SATURATION: 96 % | HEIGHT: 66 IN | SYSTOLIC BLOOD PRESSURE: 106 MMHG | RESPIRATION RATE: 16 BRPM | TEMPERATURE: 97 F | BODY MASS INDEX: 35.03 KG/M2 | DIASTOLIC BLOOD PRESSURE: 75 MMHG | HEART RATE: 88 BPM | WEIGHT: 218 LBS

## 2024-09-11 DIAGNOSIS — E66.01 SEVERE OBESITY (BMI 35.0-39.9) WITH COMORBIDITY (HCC): ICD-10-CM

## 2024-09-11 DIAGNOSIS — Z00.00 ENCOUNTER FOR SUBSEQUENT ANNUAL WELLNESS VISIT (AWV) IN MEDICARE PATIENT: ICD-10-CM

## 2024-09-11 DIAGNOSIS — I50.9 CONGESTIVE HEART FAILURE, UNSPECIFIED HF CHRONICITY, UNSPECIFIED HEART FAILURE TYPE (HCC): Primary | ICD-10-CM

## 2024-09-11 DIAGNOSIS — I50.20 HFREF (HEART FAILURE WITH REDUCED EJECTION FRACTION) (HCC): ICD-10-CM

## 2024-09-11 PROCEDURE — 3078F DIAST BP <80 MM HG: CPT | Performed by: STUDENT IN AN ORGANIZED HEALTH CARE EDUCATION/TRAINING PROGRAM

## 2024-09-11 PROCEDURE — 3017F COLORECTAL CA SCREEN DOC REV: CPT | Performed by: STUDENT IN AN ORGANIZED HEALTH CARE EDUCATION/TRAINING PROGRAM

## 2024-09-11 PROCEDURE — 3074F SYST BP LT 130 MM HG: CPT | Performed by: STUDENT IN AN ORGANIZED HEALTH CARE EDUCATION/TRAINING PROGRAM

## 2024-09-11 PROCEDURE — G0439 PPPS, SUBSEQ VISIT: HCPCS | Performed by: STUDENT IN AN ORGANIZED HEALTH CARE EDUCATION/TRAINING PROGRAM

## 2024-09-11 SDOH — ECONOMIC STABILITY: FOOD INSECURITY: WITHIN THE PAST 12 MONTHS, THE FOOD YOU BOUGHT JUST DIDN'T LAST AND YOU DIDN'T HAVE MONEY TO GET MORE.: NEVER TRUE

## 2024-09-11 SDOH — ECONOMIC STABILITY: INCOME INSECURITY: HOW HARD IS IT FOR YOU TO PAY FOR THE VERY BASICS LIKE FOOD, HOUSING, MEDICAL CARE, AND HEATING?: NOT HARD AT ALL

## 2024-09-11 SDOH — ECONOMIC STABILITY: FOOD INSECURITY: WITHIN THE PAST 12 MONTHS, YOU WORRIED THAT YOUR FOOD WOULD RUN OUT BEFORE YOU GOT MONEY TO BUY MORE.: NEVER TRUE

## 2024-09-11 ASSESSMENT — PATIENT HEALTH QUESTIONNAIRE - PHQ9
SUM OF ALL RESPONSES TO PHQ QUESTIONS 1-9: 0
SUM OF ALL RESPONSES TO PHQ QUESTIONS 1-9: 0
2. FEELING DOWN, DEPRESSED OR HOPELESS: NOT AT ALL
1. LITTLE INTEREST OR PLEASURE IN DOING THINGS: NOT AT ALL
SUM OF ALL RESPONSES TO PHQ QUESTIONS 1-9: 0
SUM OF ALL RESPONSES TO PHQ9 QUESTIONS 1 & 2: 0
SUM OF ALL RESPONSES TO PHQ QUESTIONS 1-9: 0

## 2024-09-11 ASSESSMENT — LIFESTYLE VARIABLES
HOW OFTEN DO YOU HAVE A DRINK CONTAINING ALCOHOL: 2-4 TIMES A MONTH
HOW MANY STANDARD DRINKS CONTAINING ALCOHOL DO YOU HAVE ON A TYPICAL DAY: 1 OR 2

## 2025-02-21 ENCOUNTER — COMMUNITY OUTREACH (OUTPATIENT)
Facility: CLINIC | Age: 55
End: 2025-02-21

## 2025-02-21 NOTE — PROGRESS NOTES
Patient's HM shows they are overdue for Colorectal Screening.   Care Everywhere and  files searched.   updated with 2023 colonoscopy.

## 2025-06-10 ENCOUNTER — TELEPHONE (OUTPATIENT)
Facility: CLINIC | Age: 55
End: 2025-06-10

## 2025-06-10 NOTE — TELEPHONE ENCOUNTER
attempted to contact patient to reschedule appointment with new provider, left voicemail. Patient should be scheduled with either Dr. Fraser or MANJULA Fields for new providers.

## 2025-06-11 ENCOUNTER — APPOINTMENT (OUTPATIENT)
Age: 55
End: 2025-06-11
Attending: EMERGENCY MEDICINE
Payer: OTHER GOVERNMENT

## 2025-06-11 ENCOUNTER — APPOINTMENT (OUTPATIENT)
Age: 55
End: 2025-06-11
Payer: OTHER GOVERNMENT

## 2025-06-11 ENCOUNTER — HOSPITAL ENCOUNTER (EMERGENCY)
Age: 55
Discharge: LEFT BEFORE DISCHARGE | End: 2025-06-12
Attending: EMERGENCY MEDICINE
Payer: OTHER GOVERNMENT

## 2025-06-11 DIAGNOSIS — J18.9 COMMUNITY ACQUIRED PNEUMONIA, UNSPECIFIED LATERALITY: ICD-10-CM

## 2025-06-11 LAB
ANION GAP SERPL CALC-SCNC: 18 MMOL/L (ref 7–16)
BASOPHILS # BLD: 0 K/UL (ref 0–0.1)
BASOPHILS NFR BLD: 0 % (ref 0–2)
BUN SERPL-MCNC: 17 MG/DL (ref 6–23)
BUN/CREAT SERPL: 12
CALCIUM SERPL-MCNC: 9.5 MG/DL (ref 8.5–10.1)
CHLORIDE SERPL-SCNC: 95 MMOL/L (ref 98–107)
CO2 SERPL-SCNC: 28 MMOL/L (ref 21–32)
CREAT SERPL-MCNC: 1.45 MG/DL (ref 0.6–1.3)
DIFFERENTIAL METHOD BLD: ABNORMAL
EOSINOPHIL # BLD: 0 K/UL (ref 0–0.4)
EOSINOPHIL NFR BLD: 0 % (ref 0–5)
ERYTHROCYTE [DISTWIDTH] IN BLOOD BY AUTOMATED COUNT: 13.4 % (ref 11.6–14.5)
FLUAV RNA SPEC QL NAA+PROBE: NOT DETECTED
FLUBV RNA SPEC QL NAA+PROBE: NOT DETECTED
GLUCOSE SERPL-MCNC: 116 MG/DL (ref 74–108)
HCT VFR BLD AUTO: 46.5 % (ref 36–48)
HGB BLD-MCNC: 15.9 G/DL (ref 13–16)
IMM GRANULOCYTES # BLD AUTO: 0 K/UL (ref 0–0.04)
IMM GRANULOCYTES NFR BLD AUTO: 0 % (ref 0–0.5)
LACTATE BLD-SCNC: 1.58 MMOL/L (ref 0.4–2)
LYMPHOCYTES # BLD: 0.8 K/UL (ref 0.9–3.6)
LYMPHOCYTES NFR BLD: 17 % (ref 21–52)
MCH RBC QN AUTO: 31.3 PG (ref 24–34)
MCHC RBC AUTO-ENTMCNC: 34.2 G/DL (ref 31–37)
MCV RBC AUTO: 91.5 FL (ref 78–100)
MONOCYTES # BLD: 0.42 K/UL (ref 0.05–1.2)
MONOCYTES NFR BLD: 9 % (ref 3–10)
NEUTS SEG # BLD: 3.06 K/UL (ref 1.8–8)
NEUTS SEG NFR BLD: 65 % (ref 40–73)
NRBC # BLD: 0 K/UL (ref 0–0.01)
NRBC BLD-RTO: 0 PER 100 WBC
OTHER CELLS NFR BLD MANUAL: 9
PLATELET # BLD AUTO: 227 K/UL (ref 135–420)
PLATELET COMMENT: ABNORMAL
PMV BLD AUTO: 9.5 FL (ref 9.2–11.8)
POTASSIUM SERPL-SCNC: 3.9 MMOL/L (ref 3.5–5.5)
RBC # BLD AUTO: 5.08 M/UL (ref 4.35–5.65)
RBC MORPH BLD: ABNORMAL
SARS-COV-2 RNA RESP QL NAA+PROBE: NOT DETECTED
SODIUM SERPL-SCNC: 141 MMOL/L (ref 136–145)
SOURCE: NORMAL
WBC # BLD AUTO: 4.7 K/UL (ref 4.6–13.2)

## 2025-06-11 PROCEDURE — 6360000002 HC RX W HCPCS

## 2025-06-11 PROCEDURE — 87636 SARSCOV2 & INF A&B AMP PRB: CPT

## 2025-06-11 PROCEDURE — 6370000000 HC RX 637 (ALT 250 FOR IP)

## 2025-06-11 PROCEDURE — 84484 ASSAY OF TROPONIN QUANT: CPT

## 2025-06-11 PROCEDURE — 85025 COMPLETE CBC W/AUTO DIFF WBC: CPT

## 2025-06-11 PROCEDURE — 80048 BASIC METABOLIC PNL TOTAL CA: CPT

## 2025-06-11 PROCEDURE — 94664 DEMO&/EVAL PT USE INHALER: CPT

## 2025-06-11 PROCEDURE — 99285 EMERGENCY DEPT VISIT HI MDM: CPT

## 2025-06-11 PROCEDURE — 71275 CT ANGIOGRAPHY CHEST: CPT

## 2025-06-11 PROCEDURE — 6360000004 HC RX CONTRAST MEDICATION

## 2025-06-11 PROCEDURE — 96374 THER/PROPH/DIAG INJ IV PUSH: CPT

## 2025-06-11 PROCEDURE — 87040 BLOOD CULTURE FOR BACTERIA: CPT

## 2025-06-11 PROCEDURE — 96361 HYDRATE IV INFUSION ADD-ON: CPT

## 2025-06-11 PROCEDURE — 2580000003 HC RX 258

## 2025-06-11 PROCEDURE — 71046 X-RAY EXAM CHEST 2 VIEWS: CPT

## 2025-06-11 PROCEDURE — 83880 ASSAY OF NATRIURETIC PEPTIDE: CPT

## 2025-06-11 PROCEDURE — 94640 AIRWAY INHALATION TREATMENT: CPT

## 2025-06-11 PROCEDURE — 83605 ASSAY OF LACTIC ACID: CPT

## 2025-06-11 PROCEDURE — 2500000003 HC RX 250 WO HCPCS

## 2025-06-11 RX ORDER — IOPAMIDOL 755 MG/ML
80 INJECTION, SOLUTION INTRAVASCULAR
Status: COMPLETED | OUTPATIENT
Start: 2025-06-11 | End: 2025-06-11

## 2025-06-11 RX ORDER — 0.9 % SODIUM CHLORIDE 0.9 %
500 INTRAVENOUS SOLUTION INTRAVENOUS ONCE
Status: COMPLETED | OUTPATIENT
Start: 2025-06-11 | End: 2025-06-11

## 2025-06-11 RX ORDER — IPRATROPIUM BROMIDE AND ALBUTEROL SULFATE 2.5; .5 MG/3ML; MG/3ML
1 SOLUTION RESPIRATORY (INHALATION)
Status: COMPLETED | OUTPATIENT
Start: 2025-06-11 | End: 2025-06-11

## 2025-06-11 RX ORDER — DOXYCYCLINE HYCLATE 100 MG
100 TABLET ORAL 2 TIMES DAILY
Qty: 10 TABLET | Refills: 0 | Status: SHIPPED | OUTPATIENT
Start: 2025-06-11 | End: 2025-06-11 | Stop reason: ALTCHOICE

## 2025-06-11 RX ORDER — MAGNESIUM 30 MG
30 TABLET ORAL 2 TIMES DAILY
COMMUNITY

## 2025-06-11 RX ORDER — IBUPROFEN 600 MG/1
600 TABLET, FILM COATED ORAL
Status: COMPLETED | OUTPATIENT
Start: 2025-06-11 | End: 2025-06-11

## 2025-06-11 RX ADMIN — CEFTRIAXONE 1000 MG: 1 INJECTION, POWDER, FOR SOLUTION INTRAMUSCULAR; INTRAVENOUS at 19:49

## 2025-06-11 RX ADMIN — IPRATROPIUM BROMIDE AND ALBUTEROL SULFATE 1 DOSE: .5; 3 SOLUTION RESPIRATORY (INHALATION) at 19:16

## 2025-06-11 RX ADMIN — SODIUM CHLORIDE 500 ML: 0.9 INJECTION, SOLUTION INTRAVENOUS at 19:51

## 2025-06-11 RX ADMIN — IBUPROFEN 600 MG: 600 TABLET, FILM COATED ORAL at 18:39

## 2025-06-11 RX ADMIN — IOPAMIDOL 80 ML: 755 INJECTION, SOLUTION INTRAVENOUS at 22:05

## 2025-06-11 ASSESSMENT — ENCOUNTER SYMPTOMS
VOMITING: 0
SHORTNESS OF BREATH: 0
RHINORRHEA: 1
BACK PAIN: 0
WHEEZING: 0
CHEST TIGHTNESS: 0
NAUSEA: 0
COUGH: 1
ABDOMINAL PAIN: 0

## 2025-06-11 ASSESSMENT — PAIN - FUNCTIONAL ASSESSMENT: PAIN_FUNCTIONAL_ASSESSMENT: 0-10

## 2025-06-11 ASSESSMENT — LIFESTYLE VARIABLES
HOW OFTEN DO YOU HAVE A DRINK CONTAINING ALCOHOL: MONTHLY OR LESS
HOW MANY STANDARD DRINKS CONTAINING ALCOHOL DO YOU HAVE ON A TYPICAL DAY: 1 OR 2

## 2025-06-11 ASSESSMENT — PAIN SCALES - GENERAL: PAINLEVEL_OUTOF10: 5

## 2025-06-11 NOTE — ED PROVIDER NOTES
and body aches. Pt denies abdominal pain, chest pain, SOB. He reports possible fevers at home. He has been taking tylenol. He has been visiting friends in the hospital but denies known sick contacts.     On exam pt is a&o x4. BBS with rales present to LLL. HS RR, Abdomen soft and non tender. No CVA tenderness. Oropharynx normal. Neck smooth annd supple. No rash or pallor, DP, RP 2+ bilaterally.  Calf size equal and non-tender. No ataxia noted with gait. , spa02 95% on RA.     Ddx: ACS, Pneumonia, electrolyte imbalance, viral illness, GERD, strain/ sprain and more not limited to this list    Pneumonia chest xray. Neg covid. Flu.     Normal lactic. No white count.    Pt hr 102, rr 28.    Hopsitalarnulfo rodriguez served; JAMI Leon for admission.        REASSESSMENT     ED Course as of 06/11/25 2134 Wed Jun 11, 2025 1900 Findings are concerning for infection including ill-defined right midlung  airspace opacity and likely bronchial wall thickening in both lungs.   [AC]   1911 Covid, flu negative.  [AC]   1926 POC lactic 1.58 [AC]   1935 No leukocytosis. No anemia.  [AC]   2054 Pt denies pain, SOB at this time. Discussed admission with pt. Pt declines at this time. Pt instructed to contact PCP for close f/u in office this week. Strict ED return precautions. Spouse present at bedside for same.  [AC]   2120 Called back to room. Pt has agreed to stay for admission.  [AC]   2129 Spoke with hospitalist; Pt has EF of 10-15% on last ECHO. May possibly need cardiology. Obtain CT to r/o PE.  Will come discuss with Dr. Crawford. [AC]   2133 Chart signed out to Ty Joseph.  [AC]      ED Course User Index  [AC] Marimar Edward, TONIA             FINAL IMPRESSION      1. Community acquired pneumonia, unspecified laterality          DISPOSITION/PLAN   DISPOSITION  06/11/2025 09:23:47 PM               PATIENT REFERRED TO:  No follow-up provider specified.      DISCHARGE MEDICATIONS:  Current Discharge Medication List         Controlled Substances Monitoring:          No data to display                (Please note that portions of this note were completed with a voice recognition program.  Efforts were made to edit the dictations but occasionally words are mis-transcribed.)    TONIA Carroll (electronically signed)             aMrimar Edward FNP  06/11/25 4844

## 2025-06-12 VITALS
DIASTOLIC BLOOD PRESSURE: 96 MMHG | BODY MASS INDEX: 33.43 KG/M2 | OXYGEN SATURATION: 100 % | TEMPERATURE: 99.7 F | SYSTOLIC BLOOD PRESSURE: 123 MMHG | RESPIRATION RATE: 35 BRPM | WEIGHT: 208 LBS | HEIGHT: 66 IN | HEART RATE: 91 BPM

## 2025-06-12 LAB
ARTERIAL PATENCY WRIST A: POSITIVE
BASE EXCESS BLD CALC-SCNC: 9 MMOL/L
BDY SITE: ABNORMAL
GAS FLOW.O2 O2 DELIVERY SYS: ABNORMAL
HCO3 BLD-SCNC: 34.8 MMOL/L (ref 21–28)
NT PRO BNP: 6608 PG/ML (ref 36–900)
PCO2 BLD: 49.1 MMHG (ref 35–48)
PH BLD: 7.46 (ref 7.35–7.45)
PO2 BLD: 58 MMHG (ref 83–108)
SAO2 % BLD: 90.4 % (ref 92–97)
SERVICE CMNT-IMP: ABNORMAL
SPECIMEN TYPE: ABNORMAL
TROPONIN T SERPL HS-MCNC: 23.7 NG/L (ref 0–22)

## 2025-06-12 PROCEDURE — 2700000000 HC OXYGEN THERAPY PER DAY

## 2025-06-12 PROCEDURE — 36600 WITHDRAWAL OF ARTERIAL BLOOD: CPT

## 2025-06-12 NOTE — PROGRESS NOTES
ABG performed, not crossing into chart, Picture of results in prior RN note    7.458-49.1-57.6-34.8- BE 9.0 SaO2 90.4%.   Pt placed onto 3L NC.

## 2025-06-12 NOTE — ED NOTES
This nurse accessed the patient's chart to assist the ER MD Crawford with finding the name of the admitting MD for this patient.

## 2025-06-12 NOTE — ED NOTES
Accessed patient's chart to take a picture and add a note of patient's ABG's. ABG results were not crossing over originally.

## 2025-06-12 NOTE — PLAN OF CARE
I was contacted about possible admission for Mr. Mcneil for sepsis secondary to pneumonia with acute hypoxic respiratory failure.  Mr. Mcneil is a 54-year-old male with a past medical history significant for congestive heart failure with a reduced EF of 15 to 20% status post AICD presented to the emergency department with cough, shortness of breath, fever and runny nose.  Workup in the emergency department was significant for persistent tachypnea and an initial tachycardia improved with 500 cc fluid bolus.  He is requiring 3 L of oxygen via nasal cannula to maintain his oxygen saturations in the mid 90s.  On exam done by my colleague Beatrice Saldivar NP, he reportedly has increased work of breathing with a respiratory rate in the 30s.  His respiratory rate has been documented over 35 for the last hour.  The CBC was unremarkable and a BMP was significant for a creatinine of 1.45 which is at his baseline.  Troponin mildly elevated at 24 and BNP significantly elevated at 6600.  COVID-19 and influenza were negative.  CTA chest showed no pulmonary embolism, groundglass opacities in both lungs compatible with infection and cardiomegaly with enlarged main pulmonary artery.    Given the patient's worsening moderate respiratory distress with a respiratory rate in the 30s, acute hypoxic respiratory failure now requiring 3 L of oxygen and significantly elevated BNP at 6600, I believe that the patient would benefit from a higher level of care where an echo can be obtained, cardiology can be consulted, he can be evaluated on bedside telemetry, continuous pulse oximetry while having IV antibiotics to treat likely pneumonia.  -recommend transfer to a higher level of care with ECHO and cardiology

## 2025-06-12 NOTE — DISCHARGE INSTRUCTIONS
Call PCP for follow-up in office this week  Take antibiotics as prescribed.   OTC cough medication for high blood pressure.   Tylenol and/ or Motrin for fever.   Return to ED for new or worsening/ concerning symptoms.

## 2025-06-12 NOTE — ED PROVIDER NOTES
[facility] EMERGENCY DEPARTMENT  EMERGENCY DEPARTMENT ENCOUNTER       Pt Name: Marino Mcneil  MRN: [id number]  Birthdate 1970  Date of evaluation: 6/11/2025  Provider: TONIA Carroll  9:34 PM     CHIEF COMPLAINT     No chief complaint on file.           HISTORY OF PRESENT ILLNESS    Marino Mcneil is a 54 y.o. male who presents to the emergency department with cough and body aches.       55 y/o M with pmhx of CHF, HTN presents to ED with flu like symptoms. Pt reports runny nose and body aches. Pt denies abdominal pain, chest pain, SOB. He reports possible fevers at home. He has been taking tylenol. He has been visiting friends in the hospital but denies known sick contacts.               Nursing Notes were reviewed.     REVIEW OF SYSTEMS        Review of Systems   Constitutional:  Positive for fever. Negative for activity change, chills and fatigue.   HENT:  Positive for rhinorrhea and sneezing. Negative for congestion and ear pain.    Eyes:  Negative for visual disturbance.   Respiratory:  Positive for cough. Negative for chest tightness, shortness of breath and wheezing.    Cardiovascular:  Negative for chest pain, palpitations and leg swelling.   Gastrointestinal:  Negative for abdominal pain, nausea and vomiting.   Genitourinary:  Negative for difficulty urinating, dysuria and flank pain.   Musculoskeletal:  Positive for myalgias. Negative for back pain, neck pain and neck stiffness.        Body aches     Skin:  Negative for pallor and wound.   Neurological:  Negative for dizziness, seizures, syncope, weakness, numbness and headaches.   Hematological: Negative.    Psychiatric/Behavioral: Negative.           Except as noted above the remainder of the review of systems was reviewed and negative.         PAST MEDICAL HISTORY      Past Medical History        Past Medical History:   Diagnosis Date    Cardiomegaly      CHF (congestive heart failure) (HCC) 12/2013     [facility]     on last ECHO. May possibly need cardiology. Obtain CT to r/o PE.  Will come discuss with Dr. Crawford. [AC]   2133 Chart signed out to Ty Joseph.  [AC]       ED Course User Index  [AC] Marimar Edward FNP               FINAL IMPRESSION       1. Community acquired pneumonia, unspecified laterality     2.     Dyspnea      DISPOSITION/PLAN     DISPOSITION  06/11/2025 09:23:47 PM        Admit          Controlled Substances Monitoring:             No data to display                   (Please note that portions of this note were completed with a voice recognition program.  Efforts were made to edit the dictations but occasionally words are mis-transcribed.)     TONIA Carroll (electronically signed)         Marimar Edward FNP  06/11/25 2134    Patent endorsed to me.  Patient was found to have pneumonia and CHF.  Patient was treated and admitted for these problems. While waiting for a bed the patient decided to leave because he states transport wasn't getting here fast enough.     Kodak Crawford MD  06/12/25 [id number]

## 2025-06-15 LAB
BACTERIA SPEC CULT: NORMAL
BACTERIA SPEC CULT: NORMAL
SERVICE CMNT-IMP: NORMAL
SERVICE CMNT-IMP: NORMAL

## 2025-06-25 ENCOUNTER — CARE COORDINATION (OUTPATIENT)
Facility: CLINIC | Age: 55
End: 2025-06-25

## 2025-06-25 NOTE — CARE COORDINATION
Ambulatory Care Coordination Note     6/25/2025 1:50 PM     Patient outreach attempt by this ACM today to offer care management services. ACM was unable to reach the patient by telephone today;   left voice message requesting a return phone call to this ACM.     ACM: CARLO COHEN RN     Care Summary Note:     PCP/Specialist follow up:   Future Appointments         Provider Specialty Dept Phone    7/10/2025 9:00 AM Gagan Briseno,  Family Medicine [phone]            Follow Up:   Plan for next ACM outreach in approximately 1-2 days  to complete:  - outreach attempt to offer care management services.

## 2025-06-27 ENCOUNTER — CARE COORDINATION (OUTPATIENT)
Facility: CLINIC | Age: 55
End: 2025-06-27

## 2025-06-27 NOTE — CARE COORDINATION
Ambulatory Care Coordination Note     6/27/2025 11:29 AM     Patient outreach attempt by this ACM today to offer care management services. ACM was unable to reach the patient by telephone today;   left voice message requesting a return phone call to this ACM.     ACM: CARLO COHEN RN     Care Summary Note:     PCP/Specialist follow up:   Future Appointments         Provider Specialty Dept Phone    7/10/2025 9:00 AM Gagan Briseno,  Family Medicine [phone]            Follow Up:   Plan for next ACM outreach in approximately 1-2 days  to complete:  - outreach attempt to offer care management services.

## 2025-07-21 ENCOUNTER — CARE COORDINATION (OUTPATIENT)
Facility: CLINIC | Age: 55
End: 2025-07-21

## 2025-07-21 NOTE — CARE COORDINATION
Ambulatory Care Coordination Note     7/21/2025 11:32 AM     patient outreach attempt by this ACM today to offer care management services. ACM was unable to reach the patient by telephone today;   left voice message requesting a return phone call to this ACM.     Patient closed (unable to reach patient) from the High Risk Care Management program on 7/21/2025. Care management goals have been completed. No further Ambulatory Care Manager follow up scheduled.

## 2025-08-06 ENCOUNTER — TELEPHONE (OUTPATIENT)
Facility: CLINIC | Age: 55
End: 2025-08-06